# Patient Record
Sex: FEMALE | Race: WHITE | Employment: FULL TIME | URBAN - METROPOLITAN AREA
[De-identification: names, ages, dates, MRNs, and addresses within clinical notes are randomized per-mention and may not be internally consistent; named-entity substitution may affect disease eponyms.]

---

## 2017-12-04 ENCOUNTER — TRANSFERRED RECORDS (OUTPATIENT)
Dept: HEALTH INFORMATION MANAGEMENT | Facility: CLINIC | Age: 27
End: 2017-12-04

## 2018-10-24 ENCOUNTER — OFFICE VISIT (OUTPATIENT)
Dept: FAMILY MEDICINE | Facility: CLINIC | Age: 28
End: 2018-10-24
Payer: COMMERCIAL

## 2018-10-24 VITALS
SYSTOLIC BLOOD PRESSURE: 144 MMHG | DIASTOLIC BLOOD PRESSURE: 92 MMHG | TEMPERATURE: 99 F | HEART RATE: 92 BPM | WEIGHT: 268 LBS

## 2018-10-24 DIAGNOSIS — H57.89 EYE REDNESS: Primary | ICD-10-CM

## 2018-10-24 DIAGNOSIS — H57.11 EYE PAIN, RIGHT: ICD-10-CM

## 2018-10-24 LAB
ERYTHROCYTE [DISTWIDTH] IN BLOOD BY AUTOMATED COUNT: 12.8 % (ref 10–15)
ERYTHROCYTE [SEDIMENTATION RATE] IN BLOOD BY WESTERGREN METHOD: 12 MM/H (ref 0–20)
HCT VFR BLD AUTO: 41.8 % (ref 35–47)
HGB BLD-MCNC: 13.7 G/DL (ref 11.7–15.7)
MCH RBC QN AUTO: 28.7 PG (ref 26.5–33)
MCHC RBC AUTO-ENTMCNC: 32.8 G/DL (ref 31.5–36.5)
MCV RBC AUTO: 88 FL (ref 78–100)
PLATELET # BLD AUTO: 408 10E9/L (ref 150–450)
RBC # BLD AUTO: 4.77 10E12/L (ref 3.8–5.2)
WBC # BLD AUTO: 8.7 10E9/L (ref 4–11)

## 2018-10-24 PROCEDURE — 84443 ASSAY THYROID STIM HORMONE: CPT | Performed by: FAMILY MEDICINE

## 2018-10-24 PROCEDURE — 36415 COLL VENOUS BLD VENIPUNCTURE: CPT | Performed by: FAMILY MEDICINE

## 2018-10-24 PROCEDURE — 99203 OFFICE O/P NEW LOW 30 MIN: CPT | Performed by: FAMILY MEDICINE

## 2018-10-24 PROCEDURE — 85652 RBC SED RATE AUTOMATED: CPT | Performed by: FAMILY MEDICINE

## 2018-10-24 PROCEDURE — 80053 COMPREHEN METABOLIC PANEL: CPT | Performed by: FAMILY MEDICINE

## 2018-10-24 PROCEDURE — 86431 RHEUMATOID FACTOR QUANT: CPT | Performed by: FAMILY MEDICINE

## 2018-10-24 PROCEDURE — 85027 COMPLETE CBC AUTOMATED: CPT | Performed by: FAMILY MEDICINE

## 2018-10-24 NOTE — PROGRESS NOTES
SUBJECTIVE:   Estefania Mejía is a 28 year old female who presents to clinic today for the following health issues:        Eye(s) Problem  Onset: ~ 2 years    Description:   Location: left  Pain: YES  Redness: YES    Accompanying Signs & Symptoms:  Discharge/mattering: no   Swelling: no   Visual changes: no   Fever: no   Nasal Congestion: no   Bothered by bright lights: no     History:   Trauma: no   Foreign body exposure: no     Precipitating factors:   Wearing contacts: no     Therapies Tried and outcome: steroid drops, oral steroid drops     Patient has noticed her left eye is always slight erythematous and red on the conjunctiva.  She has this ongoing for 2 years on and off she has seen few providers which out any significant diagnosis.  She denies any blurry vision however sometimes it can cause some discomfort.  No other symptoms including upper respiratory.  No discharge.    Problem list and histories reviewed & adjusted, as indicated.  Additional history: as documented        Reviewed and updated as needed this visit by clinical staff  Tobacco  Allergies  Meds  Fam Hx  Soc Hx      Reviewed and updated as needed this visit by Provider         ROS:  CONSTITUTIONAL: NEGATIVE for fever, chills, change in weight  EYES: POSITIVE for eye pain left  ENT/MOUTH: NEGATIVE for ear, mouth and throat problems  RESP: NEGATIVE for significant cough or SOB  CV: NEGATIVE for chest pain, palpitations or peripheral edema    OBJECTIVE:                                                    BP (!) 144/92  Pulse 92  Temp 99  F (37.2  C) (Tympanic)  Wt 268 lb (121.6 kg)  LMP 10/15/2018  There is no height or weight on file to calculate BMI.   GENERAL: healthy, alert, well nourished, well hydrated, no distress  Eyes range of motion is normal.  Pupils are reactive to light.  Left side of the conjunctiva has some erythema.  No drainage.         ASSESSMENT/PLAN:                                                        ICD-10-CM     1. Eye redness H57.89 OPHTHALMOLOGY ADULT REFERRAL     ESR: Erythrocyte sedimentation rate     Comprehensive metabolic panel     CBC with platelets     TSH     Rheumatoid factor   2. Eye pain, right H57.11 OPHTHALMOLOGY ADULT REFERRAL     ESR: Erythrocyte sedimentation rate     Comprehensive metabolic panel     CBC with platelets     TSH     Rheumatoid factor     Exact etiology of patient I symptoms is not clear.  I suggested she should get an ophthalmology evaluation to make sure there is no other underlying cause for her symptoms.  I do not think this is allergy or conjunctivitis.  Meanwhile I will do some blood work including some inflammatory markers, thyroid ,CBC, CMP, and rheumatoid factor to make sure there is no other systemic symptom which is causing that.  If labs are normal it will be reassuring.    Obed Shaw MD  [facility]

## 2018-10-24 NOTE — MR AVS SNAPSHOT
After Visit Summary   10/24/2018    Estefania Mejía    MRN: [id number]           Patient Information     Date Of Birth          1990        Visit Information        Provider Department      10/24/2018 3:00 PM [facility]bed Shaw MD [facility]        Today's Diagnoses     Eye redness    -  1    Eye pain, right           Follow-ups after your visit        Additional Services     OPHTHALMOLOGY ADULT REFERRAL       Your provider has referred you to: N: [facility] [place] [phone] [email or web address]    Please be aware that coverage of these services is subject to the terms and limitations of your health insurance plan.  Call member services at your health plan with any benefit or coverage questions.      Please bring the following with you to your appointment:    (1) Any X-Rays, CTs or MRIs which have been performed.  Contact the facility where they were done to arrange for  prior to your scheduled appointment.    (2) List of current medications  (3) This referral request   (4) Any documents/labs given to you for this referral                  Who to contact     If you have questions or need follow up information about today's clinic visit or your schedule please contact [facility] [place] directly at [phone].  Normal or non-critical lab and imaging results will be communicated to you by MyChart, letter or phone within 4 business days after the clinic has received the results. If you do not hear from us within 7 days, please contact the clinic through MyChart or phone. If you have a critical or abnormal lab result, we will notify you by phone as soon as possible.  Submit refill requests through "[employer] or call your pharmacy and they will forward the refill request to us. Please allow 3 business days for your refill to be completed.          Additional Information About Your Visit        MyChart Information     "[employer] lets you  "send messages to your doctor, view your test results, renew your prescriptions, schedule appointments and more. To sign up, go to [email or web address] . Click on \"Log in\" on the left side of the screen, which will take you to the Welcome page. Then click on \"Sign up Now\" on the right side of the page.     You will be asked to enter the access code listed below, as well as some personal information. Please follow the directions to create your username and password.     Your access code is: [email or web address]  Expires: 2019  3:05 PM     Your access code will  in 90 days. If you need help or a new code, please call your [place] clinic or [phone].        Care EveryWhere ID     This is your Care EveryWhere ID. This could be used by other organizations to access your [place] medical records  [id number]        Your Vitals Were     Pulse Temperature Last Period             92 99  F (37.2  C) (Tympanic) 10/15/2018          Blood Pressure from Last 3 Encounters:   10/24/18 (!) 144/92    Weight from Last 3 Encounters:   10/24/18 268 lb (121.6 kg)              We Performed the Following     CBC with platelets     Comprehensive metabolic panel     ESR: Erythrocyte sedimentation rate     OPHTHALMOLOGY ADULT REFERRAL     Rheumatoid factor     TSH        Primary Care Provider    None Specified       No primary provider on file.        Equal Access to Services     [facility]: Hadii anila harikns hadasho Soomaali, waaxda luqadaha, qaybta kaalmada adeegyada, lisa taylor . So [facility] [phone].    ATENCIÓN: Si habla español, tiene a martínez disposición servicios gratuitos de asistencia lingüística. Llame al [phone].    We comply with applicable federal civil rights laws and [place] laws. We do not discriminate on the basis of race, color, national origin, age, disability, sex, sexual orientation, or gender identity.            Thank you!     Thank you for choosing [facility] [place]  for your care. Our goal is always to provide you with excellent care. Hearing back from our patients is one way we can continue to improve our services. Please take a few minutes to complete the written survey that you may receive in the mail after your visit with us. Thank you!             Your Updated Medication List - Protect others around you: Learn how to safely use, store and throw away your medicines at www.disposemymeds.org.      Notice  As of 10/24/2018  3:05 PM    You have not been prescribed any medications.

## 2018-10-25 ENCOUNTER — TELEPHONE (OUTPATIENT)
Dept: FAMILY MEDICINE | Facility: CLINIC | Age: 28
End: 2018-10-25

## 2018-10-25 LAB
ALBUMIN SERPL-MCNC: 3.9 G/DL (ref 3.4–5)
ALP SERPL-CCNC: 95 U/L (ref 40–150)
ALT SERPL W P-5'-P-CCNC: 42 U/L (ref 0–50)
ANION GAP SERPL CALCULATED.3IONS-SCNC: 10 MMOL/L (ref 3–14)
AST SERPL W P-5'-P-CCNC: 19 U/L (ref 0–45)
BILIRUB SERPL-MCNC: 0.3 MG/DL (ref 0.2–1.3)
BUN SERPL-MCNC: 10 MG/DL (ref 7–30)
CALCIUM SERPL-MCNC: 9.2 MG/DL (ref 8.5–10.1)
CHLORIDE SERPL-SCNC: 109 MMOL/L (ref 94–109)
CO2 SERPL-SCNC: 23 MMOL/L (ref 20–32)
CREAT SERPL-MCNC: 0.65 MG/DL (ref 0.52–1.04)
GFR SERPL CREATININE-BSD FRML MDRD: >90 ML/MIN/1.7M2
GLUCOSE SERPL-MCNC: 88 MG/DL (ref 70–99)
POTASSIUM SERPL-SCNC: 3.7 MMOL/L (ref 3.4–5.3)
PROT SERPL-MCNC: 7.5 G/DL (ref 6.8–8.8)
RHEUMATOID FACT SER NEPH-ACNC: <20 IU/ML (ref 0–20)
SODIUM SERPL-SCNC: 142 MMOL/L (ref 133–144)
TSH SERPL DL<=0.005 MIU/L-ACNC: 1.71 MU/L (ref 0.4–4)

## 2018-10-25 NOTE — TELEPHONE ENCOUNTER
TC has an DOMINIQUE for patient  Left voicemail message for the Office of Dr. Jaz Beckman-ENT to contact the clinic  With a fax number for medical records  DOMINIQUE in  [place]

## 2018-10-26 ENCOUNTER — TRANSFERRED RECORDS (OUTPATIENT)
Dept: HEALTH INFORMATION MANAGEMENT | Facility: CLINIC | Age: 28
End: 2018-10-26

## 2018-10-31 ENCOUNTER — HEALTH MAINTENANCE LETTER (OUTPATIENT)
Age: 28
End: 2018-10-31

## 2018-11-06 ENCOUNTER — RESULT FOLLOW UP (OUTPATIENT)
Dept: OBGYN | Facility: CLINIC | Age: 28
End: 2018-11-06

## 2018-11-06 ENCOUNTER — OFFICE VISIT (OUTPATIENT)
Dept: OBGYN | Facility: CLINIC | Age: 28
End: 2018-11-06
Payer: COMMERCIAL

## 2018-11-06 VITALS
WEIGHT: 269.2 LBS | DIASTOLIC BLOOD PRESSURE: 80 MMHG | BODY MASS INDEX: 43.26 KG/M2 | HEIGHT: 66 IN | HEART RATE: 86 BPM | SYSTOLIC BLOOD PRESSURE: 126 MMHG

## 2018-11-06 DIAGNOSIS — Z11.3 SCREEN FOR STD (SEXUALLY TRANSMITTED DISEASE): ICD-10-CM

## 2018-11-06 DIAGNOSIS — E66.813 CLASS 3 SEVERE OBESITY WITHOUT SERIOUS COMORBIDITY WITH BODY MASS INDEX (BMI) OF 40.0 TO 44.9 IN ADULT, UNSPECIFIED OBESITY TYPE (H): ICD-10-CM

## 2018-11-06 DIAGNOSIS — Z01.419 ENCOUNTER FOR GYNECOLOGICAL EXAMINATION WITHOUT ABNORMAL FINDING: Primary | ICD-10-CM

## 2018-11-06 DIAGNOSIS — E66.01 CLASS 3 SEVERE OBESITY WITHOUT SERIOUS COMORBIDITY WITH BODY MASS INDEX (BMI) OF 40.0 TO 44.9 IN ADULT, UNSPECIFIED OBESITY TYPE (H): ICD-10-CM

## 2018-11-06 DIAGNOSIS — Z11.8 SCREENING FOR CHLAMYDIAL DISEASE: ICD-10-CM

## 2018-11-06 DIAGNOSIS — E66.01 MORBID OBESITY (H): ICD-10-CM

## 2018-11-06 PROCEDURE — 88175 CYTOPATH C/V AUTO FLUID REDO: CPT | Performed by: OBSTETRICS & GYNECOLOGY

## 2018-11-06 PROCEDURE — 36415 COLL VENOUS BLD VENIPUNCTURE: CPT | Performed by: OBSTETRICS & GYNECOLOGY

## 2018-11-06 PROCEDURE — 86696 HERPES SIMPLEX TYPE 2 TEST: CPT | Performed by: OBSTETRICS & GYNECOLOGY

## 2018-11-06 PROCEDURE — 87340 HEPATITIS B SURFACE AG IA: CPT | Performed by: OBSTETRICS & GYNECOLOGY

## 2018-11-06 PROCEDURE — 87624 HPV HI-RISK TYP POOLED RSLT: CPT | Performed by: OBSTETRICS & GYNECOLOGY

## 2018-11-06 PROCEDURE — 88141 CYTOPATH C/V INTERPRET: CPT | Performed by: OBSTETRICS & GYNECOLOGY

## 2018-11-06 PROCEDURE — 99385 PREV VISIT NEW AGE 18-39: CPT | Performed by: OBSTETRICS & GYNECOLOGY

## 2018-11-06 PROCEDURE — 86695 HERPES SIMPLEX TYPE 1 TEST: CPT | Performed by: OBSTETRICS & GYNECOLOGY

## 2018-11-06 PROCEDURE — 87389 HIV-1 AG W/HIV-1&-2 AB AG IA: CPT | Performed by: OBSTETRICS & GYNECOLOGY

## 2018-11-06 PROCEDURE — 87491 CHLMYD TRACH DNA AMP PROBE: CPT | Performed by: OBSTETRICS & GYNECOLOGY

## 2018-11-06 PROCEDURE — 87591 N.GONORRHOEAE DNA AMP PROB: CPT | Performed by: OBSTETRICS & GYNECOLOGY

## 2018-11-06 PROCEDURE — 86780 TREPONEMA PALLIDUM: CPT | Performed by: OBSTETRICS & GYNECOLOGY

## 2018-11-06 RX ORDER — NEOMYCIN SULFATE, POLYMYXIN B SULFATE AND DEXAMETHASONE 3.5; 10000; 1 MG/ML; [USP'U]/ML; MG/ML
SUSPENSION/ DROPS OPHTHALMIC
COMMUNITY
Start: 2018-10-28 | End: 2019-02-04

## 2018-11-06 ASSESSMENT — ANXIETY QUESTIONNAIRES
1. FEELING NERVOUS, ANXIOUS, OR ON EDGE: SEVERAL DAYS
6. BECOMING EASILY ANNOYED OR IRRITABLE: SEVERAL DAYS
3. WORRYING TOO MUCH ABOUT DIFFERENT THINGS: SEVERAL DAYS
7. FEELING AFRAID AS IF SOMETHING AWFUL MIGHT HAPPEN: SEVERAL DAYS
5. BEING SO RESTLESS THAT IT IS HARD TO SIT STILL: SEVERAL DAYS
GAD7 TOTAL SCORE: 7
IF YOU CHECKED OFF ANY PROBLEMS ON THIS QUESTIONNAIRE, HOW DIFFICULT HAVE THESE PROBLEMS MADE IT FOR YOU TO DO YOUR WORK, TAKE CARE OF THINGS AT HOME, OR GET ALONG WITH OTHER PEOPLE: SOMEWHAT DIFFICULT
2. NOT BEING ABLE TO STOP OR CONTROL WORRYING: SEVERAL DAYS

## 2018-11-06 ASSESSMENT — PATIENT HEALTH QUESTIONNAIRE - PHQ9
5. POOR APPETITE OR OVEREATING: SEVERAL DAYS
SUM OF ALL RESPONSES TO PHQ QUESTIONS 1-9: 0

## 2018-11-06 NOTE — PROGRESS NOTES
Estefania is a 28 year old No obstetric history on file. female who presents for annual exam.     Besides routine health maintenance, she would like to discuss getting back on a birthcontrol method. Was previous on an ocp (possibly Reclipsen) and had side effects. Would like other option.    HPI:  New patient --self referral  --here today for yearly exam.  Recently moved to [place] from [place] with her fiance.  Doing well.  Had been on OCPs for a few years until May --stopped her reclipsen due to low libido and discomfort with sex.  Had been doing continuous OCPs.  Menses have been regular since stopping her pill but would like to restart.  +SA --currently using condoms.  Not interested in implants or IUDs.  Likely not interested in having children.  No bowel/bladder issues.  No leaking or urgency issues.     but getting remarried in Jan 2019! 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  Her current contraception method is: condoms.  She  reports that she has quit smoking. She has never used smokeless tobacco.    Patient is sexually active.  STD testing offered?  Accepted  Last PHQ-9 score on record =   PHQ-9 SCORE 11/6/2018   Total Score 0     Last GAD7 score on record =   NICOL-7 SCORE 11/6/2018   Total Score 7     Alcohol Score = 3    HEALTH MAINTENANCE:  Cholesterol: (No results found for: CHOL   Last Mammo: NA, due at age 40  Pap: per patient, pap in 2017 was abnormal-ASCUS, HPV POSITIVE (unsure of what  "kind of HPV)  Colonoscopy: NA, due at age 50  Dexa: Never    Health maintenance updated:  yes    HISTORY:  Obstetric History       T0      L0     SAB0   TAB0   Ectopic0   Multiple0   Live Births0           Patient Active Problem List   Diagnosis     Obesity     Morbid obesity (H)     Past Surgical History:   Procedure Laterality Date     NO HISTORY OF SURGERY        Social History   Substance Use Topics     Smoking status: Former Smoker     Smokeless tobacco: Never Used      Comment: quit      Alcohol use Yes      Problem (# of Occurrences) Relation (Name,Age of Onset)    Diabetes (2) Mother, Father: type 2     HEART DISEASE (1) Mother    Hypertension (1) Mother            Current Outpatient Prescriptions   Medication Sig     Homeopathic Products (SINUS MEDICINE PO)      neomycin-polymyxin-dexamethasone (MAXITROL) 3.5-43437-5.1 SUSP ophthalmic susp      No current facility-administered medications for this visit.      No Known Allergies    Past medical, surgical, social and family histories were reviewed and updated in EPIC.    ROS:   12 point review of systems negative other than symptoms noted below.  Head: Nasal Congestion  Breast: Lumps  Skin: Acne  Musculoskeletal: Joint Pain  Psychiatric: Anxiety    EXAM:  /80  Pulse 86  Ht 5' 6\" (1.676 m)  Wt 269 lb 3.2 oz (122.1 kg)  LMP 10/15/2018 (Exact Date)  BMI 43.45 kg/m2   BMI: Body mass index is 43.45 kg/(m^2).    PHYSICAL EXAM:  Constitutional:  Appearance: Well nourished, well developed, alert, in no acute distress  Neck:  Lymph Nodes:  No lymphadenopathy present    Thyroid:  Gland size normal, nontender, no nodules or masses present  on palpation  Chest:  Respiratory Effort:  Breathing unlabored  Cardiovascular:    Heart: Auscultation:  Regular rate, normal rhythm, no murmurs present  Breasts: Inspection of Breasts:  No lymphadenopathy present., Palpation of Breasts and Axillae:  No masses present on palpation, no breast tenderness., " Axillary Lymph Nodes:  No lymphadenopathy present. and No nodularity, asymmetry or nipple discharge bilaterally.  Gastrointestinal:   Abdominal Examination:  Abdomen nontender to palpation, tone normal without rigidity or guarding, no masses present, umbilicus without lesions   Liver and Spleen:  No hepatomegaly present, liver nontender to palpation    Hernias:  No hernias present  Lymphatic: Lymph Nodes:  No other lymphadenopathy present  Skin:  General Inspection:  No rashes present, no lesions present, no areas of  discoloration    Genitalia and Groin:  No rashes present, no lesions present, no areas of  discoloration, no masses present  Neurologic/Psychiatric:    Mental Status:  Oriented X3     Pelvic Exam:  External Genitalia:     Normal appearance for age, no discharge present, no tenderness present, no inflammatory lesions present, color normal  Vagina:     Normal vaginal vault without central or paravaginal defects, no discharge present, no inflammatory lesions present, no masses present  Bladder:     Nontender to palpation  Urethra:   Urethral Body:  Urethra palpation normal, urethra structural support normal   Urethral Meatus:  No erythema or lesions present  Cervix:     Appearance healthy, no lesions present, nontender to palpation, no bleeding present  Uterus:     Uterus: firm, normal sized and nontender, anteverted in position.   Adnexa:     No adnexal tenderness present, no adnexal masses present  Perineum:     Perineum within normal limits, no evidence of trauma, no rashes or skin lesions present  Anus:     Anus within normal limits, no hemorrhoids present  Inguinal Lymph Nodes:     No lymphadenopathy present  Pubic Hair:     Normal pubic hair distribution for age  Genitalia and Groin:     No rashes present, no lesions present, no areas of discoloration, no masses present      COUNSELING:   Reviewed preventive health counseling, as reflected in patient instructions  Special attention given to:         Regular exercise       Healthy diet/nutrition       Contraception       Family planning    BMI: Body mass index is 43.45 kg/(m^2).  Weight management plan: Discussed healthy diet and exercise guidelines and patient will follow up in 12 months in clinic to re-evaluate.    ASSESSMENT:  28 year old female with satisfactory annual exam.    ICD-10-CM    1. Encounter for gynecological examination without abnormal finding Z01.419 Pap imaged thin layer screen reflex to HPV if ASCUS - recommended age 25 - 29 years   2. Screen for STD (sexually transmitted disease) Z11.3 NEISSERIA GONORRHOEA PCR     Treponema Abs w Reflex to RPR and Titer     Herpes Simplex Virus 1 and 2 IgG     HIV Antigen Antibody Combo     Hepatitis B surface antigen   3. Screening for chlamydial disease Z11.8 CHLAMYDIA TRACHOMATIS PCR   4. Class 3 severe obesity without serious comorbidity with body mass index (BMI) of 40.0 to 44.9 in adult, unspecified obesity type (H) E66.01     Z68.41    5. Morbid obesity (H) E66.01        PLAN:  Patient Instructions   Follow up with your primary care provider for your other medical problems.  Continue self breast exam.  Increase physical activity and exercise.  Lab and pap smear results will be called to the patient.  Reflex pap smear done today and will need colposcopy if abnormal due to patient reported hx of irregular pap smear last year.  Usual safety and preventative measures counseling done.  BMI >25  Weight loss encouraged.  Will try lower dose OCP for contraception in hopes of improving side effect profile.  Will Sunday start with next menses and contact us needed.  Previously struggled with reclipsen.      Lashonda Anderson MD

## 2018-11-06 NOTE — MR AVS SNAPSHOT
After Visit Summary   11/6/2018    Estefania Mejía    MRN: [id number]           Patient Information     Date Of Birth          1990        Visit Information        Provider Department      11/6/2018 1:50 PM Lashonda Anderson MD [facility]        Today's Diagnoses     Encounter for gynecological examination without abnormal finding    -  1    Screen for STD (sexually transmitted disease)        Screening for chlamydial disease        Class 3 severe obesity without serious comorbidity with body mass index (BMI) of 40.0 to 44.9 in adult, unspecified obesity type (H)        Morbid obesity (H)          Care Instructions    Follow up with your primary care provider for your other medical problems.  Continue self breast exam.  Increase physical activity and exercise.  Lab and pap smear results will be called to the patient.  Reflex pap smear done today and will need colposcopy if abnormal due to patient reported hx of irregular pap smear last year.  Usual safety and preventative measures counseling done.  BMI >25  Weight loss encouraged.  Will try lower dose OCP for contraception in hopes of improving side effect profile.  Will Sunday start with next menses and contact us needed.  Previously struggled with reclipsen.          Follow-ups after your visit        Follow-up notes from your care team     Return in about 1 year (around 11/6/2019) for Annual Exam.      Who to contact     If you have questions or need follow up information about today's clinic visit or your schedule please contact [facility] directly at [phone].  Normal or non-critical lab and imaging results will be communicated to you by MyChart, letter or phone within 4 business days after the clinic has received the results. If you do not hear from us within 7 days, please contact the clinic through MyChart or phone. If you have a critical or abnormal lab result, we will notify you by phone as  "soon as possible.  Submit refill requests through [employer] or call your pharmacy and they will forward the refill request to us. Please allow 3 business days for your refill to be completed.          Additional Information About Your Visit        [place] Information     [employer] gives you secure access to your electronic health record. If you see a primary care provider, you can also send messages to your care team and make appointments. If you have questions, please call your primary care clinic.  If you do not have a primary care provider, please call [phone] and they will assist you.        Care EveryWhere ID     This is your Care EveryWhere ID. This could be used by other organizations to access your [place] medical records  [id number]        Your Vitals Were     Pulse Height Last Period BMI (Body Mass Index)          86 5' 6\" (1.676 m) 10/15/2018 (Exact Date) 43.45 kg/m2         Blood Pressure from Last 3 Encounters:   11/06/18 126/80   10/24/18 (!) 144/92    Weight from Last 3 Encounters:   11/06/18 269 lb 3.2 oz (122.1 kg)   10/24/18 268 lb (121.6 kg)              We Performed the Following     CHLAMYDIA TRACHOMATIS PCR     Hepatitis B surface antigen     Herpes Simplex Virus 1 and 2 IgG     HIV Antigen Antibody Combo     NEISSERIA GONORRHOEA PCR     Pap imaged thin layer screen reflex to HPV if ASCUS - recommended age 25 - 29 years     Treponema Abs w Reflex to RPR and Titer        Primary Care Provider Office Phone # Fax #    Obed Shaw [phone]       [address]  [place] [address]        Equal Access to Services     [facility]: Hadii aad ku hadasho Sokrunal, waaxda luqadaha, qaybta kaalmada nenayatimothy, lisa ruiz. So [facility] [phone].    ATENCIÓN: Si habla español, tiene a martínez disposición servicios gratuitos de asistencia lingüística. Llame al [phone].    We comply with applicable federal civil rights laws and [place] laws. We " do not discriminate on the basis of race, color, national origin, age, disability, sex, sexual orientation, or gender identity.            Thank you!     Thank you for choosing [facility] [place]  for your care. Our goal is always to provide you with excellent care. Hearing back from our patients is one way we can continue to improve our services. Please take a few minutes to complete the written survey that you may receive in the mail after your visit with us. Thank you!             Your Updated Medication List - Protect others around you: Learn how to safely use, store and throw away your medicines at www.disposemymeds.org.          This list is accurate as of 11/6/18  2:24 PM.  Always use your most recent med list.                   Brand Name Dispense Instructions for use Diagnosis    neomycin-polymyxin-dexamethasone 3.5-84797-4.1 Susp ophthalmic susp    MAXITROL          SINUS MEDICINE PO

## 2018-11-06 NOTE — PATIENT INSTRUCTIONS
Follow up with your primary care provider for your other medical problems.  Continue self breast exam.  Increase physical activity and exercise.  Lab and pap smear results will be called to the patient.  Reflex pap smear done today and will need colposcopy if abnormal due to patient reported hx of irregular pap smear last year.  Usual safety and preventative measures counseling done.  BMI >25  Weight loss encouraged.  Will try lower dose OCP for contraception in hopes of improving side effect profile.  Will Sunday start with next menses and contact us needed.  Previously struggled with reclipsen.

## 2018-11-07 ENCOUNTER — TELEPHONE (OUTPATIENT)
Dept: OBGYN | Facility: CLINIC | Age: 28
End: 2018-11-07

## 2018-11-07 DIAGNOSIS — Z30.011 OCP (ORAL CONTRACEPTIVE PILLS) INITIATION: Primary | ICD-10-CM

## 2018-11-07 LAB
C TRACH DNA SPEC QL NAA+PROBE: NEGATIVE
HBV SURFACE AG SERPL QL IA: NONREACTIVE
HIV 1+2 AB+HIV1 P24 AG SERPL QL IA: NONREACTIVE
HSV1 IGG SERPL QL IA: 1.2 AI (ref 0–0.8)
HSV2 IGG SERPL QL IA: <0.2 AI (ref 0–0.8)
N GONORRHOEA DNA SPEC QL NAA+PROBE: NEGATIVE
SPECIMEN SOURCE: NORMAL
SPECIMEN SOURCE: NORMAL
T PALLIDUM AB SER QL: NONREACTIVE

## 2018-11-07 RX ORDER — LEVONORGESTREL/ETHIN.ESTRADIOL 0.1-0.02MG
1 TABLET ORAL DAILY
Qty: 84 TABLET | Refills: 3 | Status: SHIPPED | OUTPATIENT
Start: 2018-11-07 | End: 2019-07-24

## 2018-11-07 ASSESSMENT — ANXIETY QUESTIONNAIRES: GAD7 TOTAL SCORE: 7

## 2018-11-07 NOTE — TELEPHONE ENCOUNTER
Seen Dr. Anderson 11/6/18 -  Birth control was not sent to pharmacy     [facility] - [facility] [place]

## 2018-11-07 NOTE — PROGRESS NOTES
Please inform of normal results --STD testing all negative with exception of HSV-1 which is due to cold sores or past exposure to oral herpes.  Pap smear results will come back next week.

## 2018-11-07 NOTE — TELEPHONE ENCOUNTER
Annual 11/7/18. Pt requesting Rx for OCP. Routing to Dr. Anderson.         Will try lower dose OCP for contraception in hopes of improving side effect profile.  Will Sunday start with next menses and contact us needed.  Previously struggled with reclipsen.

## 2018-11-09 LAB
COPATH REPORT: ABNORMAL
PAP: ABNORMAL

## 2018-11-12 LAB
FINAL DIAGNOSIS: ABNORMAL
HPV HR 12 DNA CVX QL NAA+PROBE: POSITIVE
HPV16 DNA SPEC QL NAA+PROBE: NEGATIVE
HPV18 DNA SPEC QL NAA+PROBE: NEGATIVE
SPECIMEN DESCRIPTION: ABNORMAL
SPECIMEN SOURCE CVX/VAG CYTO: ABNORMAL

## 2018-11-13 ENCOUNTER — TRANSFERRED RECORDS (OUTPATIENT)
Dept: HEALTH INFORMATION MANAGEMENT | Facility: CLINIC | Age: 28
End: 2018-11-13

## 2018-11-13 NOTE — PROGRESS NOTES
12/4/17 ASCUS/+ HPV- no follow up done (transferred record/pt report)  11/6/18 ASCUS pap, + HR HPV (not 16 or 18). Plan: colpo  11/14/18 LM for pt to return call to [phone] // Pt notified (ajk)  12/5/18 [place]: Bx- AXEL 1.  Plan: cotest in 1 year (lks)  12/24/19 ASCUS pap, +HR HPV (not 16/18). 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 Plan: cotest in 1 year (lce)

## 2018-12-05 ENCOUNTER — OFFICE VISIT (OUTPATIENT)
Dept: OBGYN | Facility: CLINIC | Age: 28
End: 2018-12-05
Payer: COMMERCIAL

## 2018-12-05 VITALS — BODY MASS INDEX: 42.93 KG/M2 | SYSTOLIC BLOOD PRESSURE: 134 MMHG | DIASTOLIC BLOOD PRESSURE: 82 MMHG | WEIGHT: 266 LBS

## 2018-12-05 DIAGNOSIS — R87.610 ASCUS WITH POSITIVE HIGH RISK HPV CERVICAL: Primary | ICD-10-CM

## 2018-12-05 DIAGNOSIS — R87.810 ASCUS WITH POSITIVE HIGH RISK HPV CERVICAL: Primary | ICD-10-CM

## 2018-12-05 PROCEDURE — 57455 BIOPSY OF CERVIX W/SCOPE: CPT | Performed by: OBSTETRICS & GYNECOLOGY

## 2018-12-05 PROCEDURE — 88305 TISSUE EXAM BY PATHOLOGIST: CPT | Performed by: OBSTETRICS & GYNECOLOGY

## 2018-12-05 NOTE — PROGRESS NOTES
INDICATIONS:                                                    Is a pregnancy test required: No.  Was a consent obtained?  Yes    Estefania Mejía, is a 28 year old female, who had a recent ASCUS pap.  HPV other (+)pos.  Yes prior history of abnormal pap --had first abnormal pap smear last year in [place] (ASC-US, +other HPV) but did not follow up due to insurance issues. Here today for first colposcopy. Discussed indication, risks of infection and bleeding.    Her last pap was   Lab Results   Component Value Date    PAP ASC-US 11/06/2018    .    PROCEDURE:                                                      Cervix is stained with acetic acid and viewed colposcopically. Squamocolumnar junction is visualized in it's entirety. acetowhite lesion(s) noted at 6-7 and 10-12 o'clock . Biopsy done yes x 2. Endocervical curretage Not Done             POST PROCEDURE:                                                      IMPRESSION: Patient tolerated procedure well, colposcopy adequate and HPV    PLAN : Await the results of the biopsies.  Repeat pap in 12 months.  She  tolerated the procedure well. There were no complications. Patient was discharged in stable condition.    Patient advised to call the clinic if excessive bleeding, pelvic pain, or fever.     Follow-up plan based on pathology results.    Patient Instructions   Pap smear results were discussed with the patient and their significance. Discussed HPV and transmission. Pointed out HPV's role in dysplasia and timeline for possible resolution of disease. Discussed follow up protocols and reasons to intervene with LEEP procedure vs expectant management. Discussed healthy lifestyles and their effects on the body's ability to fight off the virus.   I will call Estefania with results and follow up plan later this week.      Lashonda Anderson MD

## 2018-12-05 NOTE — PATIENT INSTRUCTIONS
Pap smear results were discussed with the patient and their significance. Discussed HPV and transmission. Pointed out HPV's role in dysplasia and timeline for possible resolution of disease. Discussed follow up protocols and reasons to intervene with LEEP procedure vs expectant management. Discussed healthy lifestyles and their effects on the body's ability to fight off the virus.   I will call Estefania with results and follow up plan later this week.

## 2018-12-05 NOTE — MR AVS SNAPSHOT
After Visit Summary   12/5/2018    Estefania Mejía    MRN: [id number]           Patient Information     Date Of Birth          1990        Visit Information        Provider Department      12/5/2018 11:15 AM Lashonda Anderson MD; [facility] Lisa [facility]urn        Today's Diagnoses     ASCUS with positive high risk HPV cervical    -  1      Care Instructions    Pap smear results were discussed with the patient and their significance. Discussed HPV and transmission. Pointed out HPV's role in dysplasia and timeline for possible resolution of disease. Discussed follow up protocols and reasons to intervene with LEEP procedure vs expectant management. Discussed healthy lifestyles and their effects on the body's ability to fight off the virus.   I will call Estefania with results and follow up plan later this week.          Follow-ups after your visit        Follow-up notes from your care team     Return if symptoms worsen or fail to improve.      Who to contact     If you have questions or need follow up information about today's clinic visit or your schedule please contact [facility] directly at [phone].  Normal or non-critical lab and imaging results will be communicated to you by [employer], letter or phone within 4 business days after the clinic has received the results. If you do not hear from us within 7 days, please contact the clinic through [employer] or phone. If you have a critical or abnormal lab result, we will notify you by phone as soon as possible.  Submit refill requests through [employer] or call your pharmacy and they will forward the refill request to us. Please allow 3 business days for your refill to be completed.          Additional Information About Your Visit        MyChart Information     [employer] gives you secure access to your electronic health record. If you see a primary care provider, you can also send messages to your care team and make  appointments. If you have questions, please call your primary care clinic.  If you do not have a primary care provider, please call [phone] and they will assist you.        Care EveryWhere ID     This is your Care EveryWhere ID. This could be used by other organizations to access your [place] medical records  [id number]        Your Vitals Were     Last Period Breastfeeding? BMI (Body Mass Index)             10/15/2018 (Exact Date) No 42.93 kg/m2          Blood Pressure from Last 3 Encounters:   12/05/18 134/82   11/06/18 126/80   10/24/18 (!) 144/92    Weight from Last 3 Encounters:   12/05/18 266 lb (120.7 kg)   11/06/18 269 lb 3.2 oz (122.1 kg)   10/24/18 268 lb (121.6 kg)              We Performed the Following     COLPOSCOPY CERVIX/UPPER VAGINA W BX CERVIX     Surgical pathology exam        Primary Care Provider Office Phone # Fax #    Obed MD Valeria [phone]       [address] [place] [address]        Equal Access to Services     [facility]: Hadii aad ku hadasho Soomaali, waaxda luqadaha, qaybta kaalmada adeluisa, lisa taylor . So [facility] [phone].    ATENCIÓN: Si habla español, tiene a martínez disposición servicios gratuitos de asistencia lingüística. Karen al [phone].    We comply with applicable federal civil rights laws and [place] laws. We do not discriminate on the basis of race, color, national origin, age, disability, sex, sexual orientation, or gender identity.            Thank you!     Thank you for choosing [facility] FOR [facility]  for your care. Our goal is always to provide you with excellent care. Hearing back from our patients is one way we can continue to improve our services. Please take a few minutes to complete the written survey that you may receive in the mail after your visit with us. Thank you!             Your Updated Medication List - Protect others around you: Learn how to safely use, store and throw  away your medicines at www.disposemymeds.org.          This list is accurate as of 12/5/18 11:45 AM.  Always use your most recent med list.                   Brand Name Dispense Instructions for use Diagnosis    levonorgestrel-ethinyl estradiol 0.1-20 MG-MCG tablet    AVIANE/ALESSE/LESSINA    84 tablet    Take 1 tablet by mouth daily    OCP (oral contraceptive pills) initiation       neomycin-polymyxin-dexamethasone 3.5-54167-0.1 Susp ophthalmic susp    MAXITROL          SINUS MEDICINE PO

## 2018-12-07 LAB — COPATH REPORT: NORMAL

## 2018-12-14 ENCOUNTER — OFFICE VISIT (OUTPATIENT)
Dept: FAMILY MEDICINE | Facility: CLINIC | Age: 28
End: 2018-12-14
Payer: COMMERCIAL

## 2018-12-14 VITALS
DIASTOLIC BLOOD PRESSURE: 82 MMHG | WEIGHT: 266 LBS | HEART RATE: 112 BPM | SYSTOLIC BLOOD PRESSURE: 130 MMHG | BODY MASS INDEX: 42.93 KG/M2 | TEMPERATURE: 98 F

## 2018-12-14 DIAGNOSIS — G47.26 SHIFT WORK SLEEP DISORDER: Primary | ICD-10-CM

## 2018-12-14 PROCEDURE — 99213 OFFICE O/P EST LOW 20 MIN: CPT | Performed by: NURSE PRACTITIONER

## 2018-12-14 RX ORDER — ZOLPIDEM TARTRATE 10 MG/1
5 TABLET ORAL
Qty: 25 TABLET | Refills: 0 | Status: SHIPPED | OUTPATIENT
Start: 2018-12-14 | End: 2019-02-04

## 2018-12-14 NOTE — PROGRESS NOTES
"  SUBJECTIVE:                                                      Estefania Mejía is a 28 year old female who presents to clinic today for the following health issues:    Insomnia  Onset: 2 months - works overnights     Description:   Time to fall asleep (sleep latency): depends   Middle of night awakening:  YES  Early morning awakening:  YES    Progression of Symptoms:  worsening    Accompanying Signs & Symptoms:  Daytime sleepiness/napping: YES  Excessive snoring/apnea: no  Restless legs: YES-  Frequent urination: no  Chronic pain:  no    History:  Prior Insomnia: YES    Precipitating factors:   New stressful situation: YES- is in school   Caffeine intake: yes   OTC decongestants: no  Any new medications: BC a month ago     Alleviating factors:  Self medicating (alcohol, etc.):  no    Therapies Tried and outcome: benadryl, melatonin, essential oils, flexeril     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 Only getting 3-4 hours of sleep each night. Has really affected her mood and lifestyle. Tried melatonin, Benadryl, trazodone, flexeril. Has blackout curtains in her room. Comes home, eats breakfast, and goes to bed. States that she is actually tired and does fall asleep relatively quickly. Her issue is staying asleep for more than a couple of hours. Doesn't use caffeine heavily (has reduced this recently, actually). Flexeril helps, but she feels \"hungover\" and \"groggy\" after awakening. In the past, she has struggled with anxiety-related insomnia for which she was prescribed suvorexant. She did not like this medication. Denies PTSD, sleep apnea, restless legs syndrome, endocrine dysfunction,nocturnal urinary frequency, stimulant use, or other organic cause for her symptoms. Feels like her body would eventually adjust and acclimate to her new schedule, but she may need something short-term to help her dampen the negative effects of sleep deprivation on her daily " life.    Problem list and histories reviewed & adjusted, as indicated.  Additional history: as documented    Reviewed and updated as needed this visit by clinical staff  Tobacco  Allergies  Meds  Problems  Med Hx  Surg Hx  Fam Hx       Reviewed and updated as needed this visit by Provider  Tobacco  Allergies  Meds  Problems  Med Hx  Surg Hx  Fam Hx         ROS:  Constitutional, HEENT, pulmonary, , musculoskeletal, neuro, endocrine and psych systems are negative, except as otherwise noted.    OBJECTIVE:                                                      /82 (BP Location: Right arm, Patient Position: Chair, Cuff Size: Adult Large)   Pulse 112   Temp 98  F (36.7  C) (Tympanic)   Wt 120.7 kg (266 lb)   LMP 10/15/2018   BMI 42.93 kg/m   Body mass index is 42.93 kg/m .   GENERAL: healthy, alert, well nourished, well hydrated, no distress  NEURO: mentation- intact, speech- normal  PSYCH: Alert and oriented times 3; speech- coherent , normal rate and volume; able to articulate logical thoughts, able to abstract reason, no tangential thoughts, no hallucinations or delusions, affect- normal    Diagnostic test results:  none     ASSESSMENT/PLAN:                                                      Estefania was seen today for insomnia. Ideally, this issue is transient in nature, and she will eventually become accustomed to this unorthodox sleep schedule. In the meantime, I feel like, in the interest of her health and wellbeing, a short-term pharmacologic regimen of a sleep aid is warranted and necessary. After discussing all of the options (including agents that she has tried and failed in the past), we determined that zolpidem would be a good choice for her. She agrees to try this before an episode of sleep on a non-working day. If this works for her, she agrees to use it only as directed and as needed with the eventual goal of no longer needing it. Also discussed other potential facilitators of  sleep, including abstinence from caffeine and staying awake longer after getting home from work to ensure that she is tired enough to fall and stay asleep until her desired wakening time. Discussed risks, benefits, alternatives, and potential side effects of new medication / treatment. Agrees with plan of care. All questions answered. Will see in two months for follow up (unless she has achieved good quantity and quality of sleep by then without the assistance of Ambien).      Diagnoses and all orders for this visit:    Shift work sleep disorder  -     zolpidem (AMBIEN) 10 MG tablet; Take 0.5 tablets (5 mg) by mouth nightly as needed for sleep      Risks, benefits and alternatives of treatments discussed. Plan agreed upon and all questions answered.      Follow-Up: Return in about 2 months (around 2/14/2019).    See Patient Instructions      TONY Lugo, CNP

## 2019-02-04 ENCOUNTER — APPOINTMENT (OUTPATIENT)
Dept: GENERAL RADIOLOGY | Facility: CLINIC | Age: 29
End: 2019-02-04
Payer: COMMERCIAL

## 2019-02-04 ENCOUNTER — TRANSFERRED RECORDS (OUTPATIENT)
Dept: HEALTH INFORMATION MANAGEMENT | Facility: CLINIC | Age: 29
End: 2019-02-04

## 2019-02-04 ENCOUNTER — HOSPITAL ENCOUNTER (EMERGENCY)
Facility: CLINIC | Age: 29
Discharge: HOME | End: 2019-02-04
Attending: EMERGENCY MEDICINE | Admitting: EMERGENCY MEDICINE
Payer: COMMERCIAL

## 2019-02-04 ENCOUNTER — TELEPHONE (OUTPATIENT)
Dept: FAMILY MEDICINE | Facility: CLINIC | Age: 29
End: 2019-02-04

## 2019-02-04 VITALS
RESPIRATION RATE: 16 BRPM | OXYGEN SATURATION: 100 % | TEMPERATURE: 97.1 F | BODY MASS INDEX: 42.93 KG/M2 | HEART RATE: 82 BPM | HEIGHT: 66 IN | SYSTOLIC BLOOD PRESSURE: 142 MMHG | DIASTOLIC BLOOD PRESSURE: 80 MMHG

## 2019-02-04 DIAGNOSIS — S52.509S CLOSED FRACTURE OF DISTAL END OF RADIUS, UNSPECIFIED FRACTURE MORPHOLOGY, UNSPECIFIED LATERALITY, SEQUELA: Primary | ICD-10-CM

## 2019-02-04 DIAGNOSIS — S52.611A CLOSED DISPLACED FRACTURE OF STYLOID PROCESS OF RIGHT ULNA, INITIAL ENCOUNTER: ICD-10-CM

## 2019-02-04 DIAGNOSIS — S52.571A OTHER CLOSED INTRA-ARTICULAR FRACTURE OF DISTAL END OF RIGHT RADIUS, INITIAL ENCOUNTER: ICD-10-CM

## 2019-02-04 PROCEDURE — 25000128 H RX IP 250 OP 636: Performed by: EMERGENCY MEDICINE

## 2019-02-04 PROCEDURE — 29515 APPLICATION SHORT LEG SPLINT: CPT | Mod: RT

## 2019-02-04 PROCEDURE — 96376 TX/PRO/DX INJ SAME DRUG ADON: CPT

## 2019-02-04 PROCEDURE — 73110 X-RAY EXAM OF WRIST: CPT | Mod: RT

## 2019-02-04 PROCEDURE — 96374 THER/PROPH/DIAG INJ IV PUSH: CPT

## 2019-02-04 PROCEDURE — 99285 EMERGENCY DEPT VISIT HI MDM: CPT | Mod: 25

## 2019-02-04 PROCEDURE — 96375 TX/PRO/DX INJ NEW DRUG ADDON: CPT

## 2019-02-04 RX ORDER — ONDANSETRON 2 MG/ML
4 INJECTION INTRAMUSCULAR; INTRAVENOUS ONCE
Status: COMPLETED | OUTPATIENT
Start: 2019-02-04 | End: 2019-02-04

## 2019-02-04 RX ORDER — OXYCODONE HYDROCHLORIDE 5 MG/1
5 TABLET ORAL EVERY 6 HOURS PRN
Qty: 8 TABLET | Refills: 0 | Status: SHIPPED | OUTPATIENT
Start: 2019-02-04 | End: 2019-05-07

## 2019-02-04 RX ORDER — HYDROMORPHONE HYDROCHLORIDE 1 MG/ML
0.5 INJECTION, SOLUTION INTRAMUSCULAR; INTRAVENOUS; SUBCUTANEOUS
Status: DISCONTINUED | OUTPATIENT
Start: 2019-02-04 | End: 2019-02-04 | Stop reason: HOSPADM

## 2019-02-04 RX ORDER — ONDANSETRON 4 MG/1
4-8 TABLET, ORALLY DISINTEGRATING ORAL EVERY 8 HOURS PRN
Qty: 30 TABLET | Refills: 0 | Status: SHIPPED | OUTPATIENT
Start: 2019-02-04 | End: 2019-05-07

## 2019-02-04 RX ADMIN — HYDROMORPHONE HYDROCHLORIDE 0.5 MG: 1 INJECTION, SOLUTION INTRAMUSCULAR; INTRAVENOUS; SUBCUTANEOUS at 10:09

## 2019-02-04 RX ADMIN — ONDANSETRON 4 MG: 2 INJECTION INTRAMUSCULAR; INTRAVENOUS at 11:02

## 2019-02-04 RX ADMIN — HYDROMORPHONE HYDROCHLORIDE 0.5 MG: 1 INJECTION, SOLUTION INTRAMUSCULAR; INTRAVENOUS; SUBCUTANEOUS at 09:16

## 2019-02-04 RX ADMIN — ONDANSETRON 4 MG: 2 INJECTION INTRAMUSCULAR; INTRAVENOUS at 09:13

## 2019-02-04 RX ADMIN — ONDANSETRON 4 MG: 2 INJECTION INTRAMUSCULAR; INTRAVENOUS at 12:03

## 2019-02-04 ASSESSMENT — ENCOUNTER SYMPTOMS
NUMBNESS: 0
FEVER: 0

## 2019-02-04 NOTE — ED AVS SNAPSHOT
Emergency Department  [address] [place] [address]  Phone:  [phone]  Fax:  [phone]                                    Estefania Mejía   MRN: [id number]    Department:   Emergency Department   Date of Visit:  2/4/2019           After Visit Summary Signature Page    I have received my discharge instructions, and my questions have been answered. I have discussed any challenges I see with this plan with the nurse or doctor.    ..........................................................................................................................................  Patient/Patient Representative Signature      ..........................................................................................................................................  Patient Representative Print Name and Relationship to Patient    ..................................................               ................................................  Date                                   Time    ..........................................................................................................................................  Reviewed by Signature/Title    ...................................................              ..............................................  Date                                               Time          22EPIC Rev 08/18

## 2019-02-04 NOTE — LETTER
February 4, 2019      To Whom It May Concern:      Estefania Mejía was seen in our Emergency Department today, 02/04/19.  Please excuse her from work until she is cleared by orthopedics to return.    Sincerely,        Maribel Rowan MD

## 2019-02-04 NOTE — DISCHARGE INSTRUCTIONS
*Elevate your arm as much as possible.  *Ibuprofen and/or Tylenol as directed as needed for pain.  Oxycodone for severe pain not relieved by ibuprofen and/or Tylenol.  *Follow-up in clinic with Dr. Dodson this afternoon.  You should receive a call from his care coordinator.  *Return if you develop worsening pain, number cool fingers, color change in her fingers, faint or feel like you will faint or become worse in any way.    Discharge Instructions  Splint Care    You had a splint put on today to help protect your injury and help it heal.  Splints are used to treat things like strains, sprains, large cuts, and fractures (broken bones). Splints are temporary and are designed to allow for swelling.    Be sure your splint is not too tight!  If you splint is too tight, it may cause loss of blood supply.  Signs of your splint being too tight include: your arm or leg hurting a lot more; your fingers or toes getting numb, cold, pale or blue; or your child is crying, fussing or seeming restless.    Generally, every Emergency Department visit should have a follow-up clinic visit with either a primary or a specialty clinic/provider. Please follow-up as instructed by your emergency provider today.  Return to the Emergency Department right away if:  You have increased pain or pressure around the injury.  You have numbness, tingling, or cool, pale, or blue toes or fingers past the injury.  Your child is more fussy than normal, crying a lot, or restless.  Your splint becomes soft, breaks, or is wet.  Your splint begins to smell bad.  Your splint is cutting into your skin.    Home care:  Keep the injured area above the level of your heart while laying or sitting down.  This will help decrease the swelling and the pain.  Keep the splint dry.  Do not put objects down or inside the splint.  If there is an elastic bandage (Ace  wrap) holding the splint on this may be loosened slightly to relieve pressure or pain.  If pain continues  return to the Emergency Department right away.  Do not remove your splint by yourself unless told to by your provider.    Follow-up:  Sometimes the splint put on in the Emergency Department needs to be changed once the swelling has gone down and a more permanent cast needs to be placed.  This is usually done by a bone specialist provider (Orthopedist).  Follow the instructions given to you by your provider today.    If you were given a prescription for medicine here today, be sure to read all of the information (including the package insert) that comes with your prescription.  This will include important information about the medicine, its side effects, and any warnings that you need to know about.  The pharmacist who fills the prescription can provide more information and answer questions you may have about the medicine.  If you have questions or concerns that the pharmacist cannot address, please call or return to the Emergency Department.     Remember that you can always come back to the Emergency Department if you are not able to see your regular provider in the amount of time listed above, if you get any new symptoms, or if there is anything that worries you.

## 2019-02-04 NOTE — ED PROVIDER NOTES
"  History     Chief Complaint:  Fall    HPI   Estefania Mejía is a 28 year old female who presents after a fall. The patient slipped and fell on the ice, landing on her buttocks and she braced herself with her right hand. She injured her right wrist. She denies hitting her head or any loss of consciousness. She denies any numbness to her right hand. The patient is right handed. She denies any other injuries. The patient is a nurse here at [employer].     Allergies:  No known drug allergies.    Medications:    Levonorgestrel-ethinyl estradiol (aviane,alesse,lessina) 0.1-20 mg-mcg per tablet  Zolpidem (ambien) 10 mg tablet     Past Medical History:    Anxiety  ASCUS with positive high risk HPV cervical  Morbid obesity    Past Surgical History:    History reviewed. No pertinent past surgical history.    Family History:    Hypertension  Diabetes  Heart disease    Social History:  Patient is   Tobacco Use: Former smoker  Alcohol Use: Yes  PCP: Obed Shaw     Review of Systems   Constitutional: Negative for fever.   Musculoskeletal:        Right wrist injury   Neurological: Negative for numbness.        No LOC   All other systems reviewed and are negative.    Physical Exam   First Vitals:  Patient Vitals for the past 24 hrs:   BP Temp Temp src Pulse Resp SpO2 Height   02/04/19 0749 138/72 97.1  F (36.2  C) Oral 78 18 97 % 1.676 m (5' 6\")     Physical Exam  General: Well-nourished, appears to be in severe pain   Eyes: PERRL, conjunctivae pink no scleral icterus or conjunctival injection  ENT:  Moist mucus membranes, posterior oropharynx clear without erythema or exudates, no hemotympanum  Respiratory:  Lungs clear to auscultation bilaterally, no crackles/rubs/wheezes.  Good air movement.  No seatbelt sign or ecchymoses  CV: Normal rate and rhythm, no murmurs/rubs/gallops  GI:  Abdomen soft and non-distended.  Normoactive BS.  No tenderness, guarding or rebound  Skin: Warm, dry.  No rashes or " petechiae  Musculoskeletal: +obvious deformity with ecchmosis and tenderness at right wrist.  Normal symmetric radial pulse and distal capillary refill, sensation and temperature.  No peripheral edema or calf tenderness.  No midline tenderness of the cervical/thoracic/lumbar spine.  No tenderness/crepitus/bony stepoffs over the clavicles, chest wall, pelvis, remainder of arms or legs.  Neuro: Alert and oriented to person/place/time  Psychiatric: Normal affect      Emergency Department Course     Imaging:  Radiographic findings were communicated with the patient who voiced understanding of the findings.  XR wrist right 3 views:  There is a comminuted intra-articular fracture of the  distal right radius with apex volar angulation and shortening. There  is also a displaced comminuted fracture of the ulnar styloid. Per radiology read.    Interventions:  0913: Zofran 4mg IV  0916: Dilaudid 0.5mg IV  1102: Zofran 4mg IV  1203: Zofran 4mg IV      Narrative:      Custom plaster reverse sugar tong splint was applied to the right wrist. After placement I checked and adjusted the fit to ensure proper positioning. Patient was more comfortable with splint in place. Sensation and circulation are intact after splint placement.      Emergency Department Course:  9:58 AM Nursing notes and vitals reviewed.  I performed an exam of the patient as documented above.   9:58 AM I consulted with Dr. Putnam of orthopedics regarding the patient who recommended outpatient follow up.  10:30 AM I spoke with Dr. Dodson for hand surgery.  I updated the patient with recommendation to sedate and reduce prior to splinting.  She declined.  She understands the risks and the benefits of sedation and plans to follow-up in clinic this afternoon with surgery tomorrow.  12:08 PM Findings and plan explained to the patient. Patient discharged home with instructions regarding supportive care, medications, and reasons to return. The importance of close  follow-up was reviewed.     Impression & Plan      Medical Decision Making:  Estefania Mejía is a 28 year old female who presents for evaluation of wrist pain after a slip and fall on ice.  CMS is intact distally in the extremity.  Xrays reveal a fracture that is reduced successfully in ED with subsequent splint placement, see above procedure notes.  I did consult with both general trauma orthopedics doctor for likely as well as the hand surgeon Dr. Dodson.  Initially Orth O recommended splint placement with short follow-up but no reduction.  Dr. Dodson thought the patient might feel more comfortable with reduction.  I discussed this with Ms. Parker him and she declined reduction as it would require sedation or a hematoma block.  Dr. Dodson was actually able to arrange for follow-up later this afternoon and his outpatient clinic with the plan for surgery tomorrow.  The patients head to toe trauma exam is otherwise normal at this time and no further trauma workup is needed as I believe there is no signs of serious head, neck, chest, spinal, extremity or abdominal injuries warranting this.      Diagnosis:    ICD-10-CM    1. Other closed intra-articular fracture of distal end of right radius, initial encounter S52.571A    2. Closed displaced fracture of styloid process of right ulna, initial encounter S52.611A        Disposition:  discharged to home    Discharge Medications:     Medication List      Started    ondansetron 4 MG ODT tab  Commonly known as:  ZOFRAN ODT  4-8 mg, Oral, EVERY 8 HOURS PRN     oxyCODONE 5 MG tablet  Commonly known as:  ROXICODONE  5 mg, Oral, EVERY 6 HOURS PRN          I, Bradley Aasen, am serving as a scribe on 2/4/2019 at 8:55 AM to personally document services performed by Maribel Rowan MD based on my observations and the provider's statements to me.          Maribel Rowan MD  02/06/19 0686

## 2019-02-04 NOTE — TELEPHONE ENCOUNTER
Reason for Call:  Other Referral    Detailed comments: Pt fell and broke her wrist, she has surgery scheduled for 2/5/19 Please send a referral over to [facility] Fax [phone]    Phone Number Patient can be reached at: Home number on file [phone] (home)    Best Time:     Can we leave a detailed message on this number? YES    Call taken on 2/4/2019 at 3:27 PM by Tanvi Tomlin

## 2019-02-04 NOTE — TELEPHONE ENCOUNTER
Patient called after contacting her insurance. States they suggested to put in two referrals; one to the [facility] and another to Dr. Anderson Dodson.     Ph: [phone] detailed message christophe DA SILVA  Patient Representative - [place]

## 2019-02-04 NOTE — TELEPHONE ENCOUNTER
Patient fell this morning went to [facility] and was scheduled for surgery tomorrow.       Patient talk to her insurance company and they told her that she needs a referral to be placed before her surgery tomorrow at 11:30.     Patient is going to the [facility] in [place] with Dr. Anderson Dodson     Patient needs referral. Pended    Call back number: [phone]    Savannah GAXIOLAN, RN   [facility]

## 2019-02-05 ENCOUNTER — TELEPHONE (OUTPATIENT)
Dept: OBGYN | Facility: CLINIC | Age: 29
End: 2019-02-05

## 2019-02-05 NOTE — TELEPHONE ENCOUNTER
Informed patient that spoke with location multiple times but they were unsure which other referral was needed for the surgery center. Md did sign the referral just waiting for them to fax it to them and she will recieved a call when com pelted.       Patient now requesting a physical therapy referral to [facility].     Routing to PCP for further review/recommendations/orders.        Savannah RAHMAN, RN   [facility]

## 2019-02-05 NOTE — TELEPHONE ENCOUNTER
Please advise patient to get a physical therapy referral from his orthopedic.  Follow-up in the clinic after surgery with we will can review and see if it is needed.

## 2019-02-05 NOTE — TELEPHONE ENCOUNTER
Routing to team 1 to Fax Referral : [phone]. Thank you      Please call patient when completed.   Ph: [phone] detailed message evan RAHMAN, RN   [facility]

## 2019-02-05 NOTE — TELEPHONE ENCOUNTER
S/w pt and gave md reply about referral to PT should come from ortho.    Pt states understanding.    Flora SMITH RN  EP Triage

## 2019-02-05 NOTE — TELEPHONE ENCOUNTER
Pt having surgery today for broken wrist  Asking if she can take her OCP prior to surgery to stay on schedule.  Yes, she can take the pill with sips of water if ok with her surgeon and anesthesia  Pt verbalized understanding and no further questions.

## 2019-02-05 NOTE — TELEPHONE ENCOUNTER
Patient calling again. She stated she needs a 3rd referral for physical therapy - [facility].   She was told by her insurance, she needs a referral for any place she goes to.    Please call patient to notify when this is all complete.  [phone] (home)       Thank you  Gracie Oseguera

## 2019-02-05 NOTE — TELEPHONE ENCOUNTER
Routing to team 1 to fax referrals.     Orthopedics referral gets faxed to [phone]    Physical therapy referral gets faxed to [phone].      Spoke with patient about information below from Dr. Shaw.   Please advise patient to get a physical therapy referral from his orthopedic.  Follow-up in the clinic after surgery with we will can review and see if it is needed.  Explained he did place one but advising her to still speak with her orthopedic surgeon.   Patient stated an understanding and agreed with plan.      Savannah GAXIOLAN, RN   [facility]

## 2019-02-05 NOTE — TELEPHONE ENCOUNTER
Both printed and faxed as requested from patient.      .Krystal DONIS    [facility] [place]

## 2019-02-18 ENCOUNTER — TRANSFERRED RECORDS (OUTPATIENT)
Dept: HEALTH INFORMATION MANAGEMENT | Facility: CLINIC | Age: 29
End: 2019-02-18

## 2019-03-18 ENCOUNTER — TRANSFERRED RECORDS (OUTPATIENT)
Dept: HEALTH INFORMATION MANAGEMENT | Facility: CLINIC | Age: 29
End: 2019-03-18

## 2019-05-07 ENCOUNTER — OFFICE VISIT (OUTPATIENT)
Dept: FAMILY MEDICINE | Facility: CLINIC | Age: 29
End: 2019-05-07
Payer: COMMERCIAL

## 2019-05-07 VITALS
WEIGHT: 270 LBS | BODY MASS INDEX: 43.39 KG/M2 | DIASTOLIC BLOOD PRESSURE: 80 MMHG | SYSTOLIC BLOOD PRESSURE: 130 MMHG | HEART RATE: 106 BPM | HEIGHT: 66 IN | TEMPERATURE: 98.7 F

## 2019-05-07 DIAGNOSIS — B36.0 TINEA VERSICOLOR: Primary | ICD-10-CM

## 2019-05-07 DIAGNOSIS — M62.838 NECK MUSCLE SPASM: ICD-10-CM

## 2019-05-07 PROCEDURE — 99213 OFFICE O/P EST LOW 20 MIN: CPT | Performed by: NURSE PRACTITIONER

## 2019-05-07 RX ORDER — FLUCONAZOLE 150 MG/1
300 TABLET ORAL WEEKLY
Qty: 4 TABLET | Refills: 0 | Status: SHIPPED | OUTPATIENT
Start: 2019-05-07 | End: 2019-09-11

## 2019-05-07 RX ORDER — CYCLOBENZAPRINE HCL 10 MG
10 TABLET ORAL 3 TIMES DAILY PRN
Qty: 30 TABLET | Refills: 0 | Status: SHIPPED | OUTPATIENT
Start: 2019-05-07 | End: 2020-05-04

## 2019-05-07 ASSESSMENT — MIFFLIN-ST. JEOR: SCORE: 1971.46

## 2019-05-07 NOTE — PROGRESS NOTES
SUBJECTIVE:                                                      Estefania Barrios is a 28 year old female who presents to clinic today for the following health issues:    Back Pain       Duration: 2 weeks         Specific cause: lifting patients, works in healthcare     Description:   Location of pain: low back bilateral and neck   Character of pain: dull ache and cramping  Pain radiation:radiates into the right buttocks and radiates into the left buttocks  New numbness or weakness in legs, not attributed to pain:  no     Intensity: Currently 5/10, moderate    History:   Pain interferes with job: No,   History of back problems: no prior back problems  Any previous MRI or X-rays: None  Sees a specialist for back pain:  No  Therapies tried without relief: cold, heat and NSAIDs    Alleviating factors:   Improved by: NSAIDs      Precipitating factors:  Worsened by: more stiff in the am       Accompanying Signs & Symptoms:  Risk of Fracture:  None  Risk of Cauda Equina:  None  Risk of Infection:  None  Risk of Cancer:  None  Risk of Ankylosing Spondylitis:  Onset at age <35, male, AND morning back stiffness. no     HPI: Estefania presents today with two concerns. First, she is experiencing a flare of a chronic back and neck issue. She has long struggled with periodic low back pain and upper back / neck / shoulder stiffness and spasm. 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 No new or concerning features compared with past episodes. Historically, rest, ibuprofen, and Flexeril have contributed to timely resolution of this issue.     Second, Estefania suffers from tinea versicolor. She is experiencing the early signs of a resurgence of this issue (which she typically suffers during seasonal transitions  "into warm / humid months). The issue is primarily over her back. She usually utilizes fluconazole x 1 every few weeks, and this generally leads to improvement / resolution. She denies pregnancy / possibility of pregnancy.     Problem list and histories reviewed & adjusted, as indicated.  Additional history: as documented    Reviewed and updated as needed this visit by clinical staff  Tobacco  Allergies  Meds  Problems  Med Hx  Surg Hx  Fam Hx       Reviewed and updated as needed this visit by Provider  Tobacco  Allergies  Meds  Problems  Med Hx  Surg Hx  Fam Hx         ROS:  Constitutional, musculoskeletal, neuro, skin systems are negative, except as otherwise noted.    OBJECTIVE:                                                      /80 (BP Location: Right arm, Patient Position: Chair, Cuff Size: Adult Large)   Pulse 106   Temp 98.7  F (37.1  C) (Tympanic)   Ht 1.676 m (5' 6\")   Wt 122.5 kg (270 lb)   LMP 12/24/2018   BMI 43.58 kg/m   Body mass index is 43.58 kg/m .   GENERAL: healthy, alert, well nourished, well hydrated, no distress  MS: extremities- no gross deformities noted, no edema  SKIN: Pigmentation irregularities over mid back. Appearance consistent with very early tinea versicolor.  NEURO: strength and tone- normal, sensory exam- grossly normal, mentation- intact, speech- normal, reflexes- symmetric    Diagnostic test results:  none     ASSESSMENT/PLAN:                                                      Estefania was seen today for back pain and derm problem. Given that these are chronic issues for her that respond favorably to established treatments, will order these medications for her today. No red flags or symptoms out of character for her today. Discussed reasons to call or return to clinic. Estefania acknowledges and demonstrates understanding of circumstances under which care should be sought urgently or emergently. Follow up as discussed. Discussed risks, benefits, " alternatives, potential side effects, and proper administration of new medication / treatment. Agrees with plan of care. All questions answered.     Diagnoses and all orders for this visit:    Tinea versicolor  -     fluconazole (DIFLUCAN) 150 MG tablet; Take 2 tablets (300 mg) by mouth once a week for 2 doses    Neck muscle spasm  -     cyclobenzaprine (FLEXERIL) 10 MG tablet; Take 1 tablet (10 mg) by mouth 3 times daily as needed for muscle spasms      Risks, benefits and alternatives of treatments discussed. Plan agreed upon and all questions answered.      Follow-Up: Return in about 6 months (around 11/7/2019) for Physical Exam.    See Patient Instructions      TONY Lugo, CNP

## 2019-07-24 ENCOUNTER — MYC REFILL (OUTPATIENT)
Dept: OBGYN | Facility: CLINIC | Age: 29
End: 2019-07-24

## 2019-07-24 DIAGNOSIS — Z30.011 OCP (ORAL CONTRACEPTIVE PILLS) INITIATION: ICD-10-CM

## 2019-07-24 RX ORDER — LEVONORGESTREL/ETHIN.ESTRADIOL 0.1-0.02MG
1 TABLET ORAL DAILY
Qty: 84 TABLET | Refills: 1 | Status: SHIPPED | OUTPATIENT
Start: 2019-07-24 | End: 2019-11-23

## 2019-07-24 NOTE — TELEPHONE ENCOUNTER
"Requested Prescriptions   Pending Prescriptions Disp Refills     levonorgestrel-ethinyl estradiol (AVIANE/ALESSE/LESSINA) 0.1-20 MG-MCG tablet 84 tablet 3     Sig: Take 1 tablet by mouth daily       Contraceptives Protocol Passed - 7/24/2019  7:57 AM        Passed - Patient is not a current smoker if age is 35 or older        Passed - Recent (12 mo) or future (30 days) visit within the authorizing provider's specialty     Patient had office visit in the last 12 months or has a visit in the next 30 days with authorizing provider or within the authorizing provider's specialty.  See \"Patient Info\" tab in inbasket, or \"Choose Columns\" in Meds & Orders section of the refill encounter.              Passed - Medication is active on med list        Passed - No active pregnancy on record        Passed - No positive pregnancy test in past 12 months        Last Written Prescription Date:  11/7/18  Last Fill Quantity: 84,  # refills: 3   Last office visit: 12/5/2018 with prescribing provider:  Dr Anderson     Prescription approved per [facility] Refill Protocol.      "

## 2019-08-28 ENCOUNTER — OFFICE VISIT (OUTPATIENT)
Dept: FAMILY MEDICINE | Facility: CLINIC | Age: 29
End: 2019-08-28
Payer: COMMERCIAL

## 2019-08-28 VITALS
TEMPERATURE: 97.9 F | WEIGHT: 273 LBS | DIASTOLIC BLOOD PRESSURE: 78 MMHG | OXYGEN SATURATION: 98 % | BODY MASS INDEX: 44.06 KG/M2 | HEART RATE: 115 BPM | SYSTOLIC BLOOD PRESSURE: 134 MMHG

## 2019-08-28 DIAGNOSIS — F40.10 SOCIAL ANXIETY DISORDER: Primary | ICD-10-CM

## 2019-08-28 PROCEDURE — 99213 OFFICE O/P EST LOW 20 MIN: CPT | Performed by: NURSE PRACTITIONER

## 2019-08-28 RX ORDER — PROPRANOLOL HYDROCHLORIDE 40 MG/1
40 TABLET ORAL DAILY PRN
Qty: 60 TABLET | Refills: 1 | Status: SHIPPED | OUTPATIENT
Start: 2019-08-28 | End: 2019-12-24

## 2019-08-28 ASSESSMENT — PATIENT HEALTH QUESTIONNAIRE - PHQ9
SUM OF ALL RESPONSES TO PHQ QUESTIONS 1-9: 3
5. POOR APPETITE OR OVEREATING: SEVERAL DAYS

## 2019-08-28 ASSESSMENT — ANXIETY QUESTIONNAIRES
5. BEING SO RESTLESS THAT IT IS HARD TO SIT STILL: NOT AT ALL
IF YOU CHECKED OFF ANY PROBLEMS ON THIS QUESTIONNAIRE, HOW DIFFICULT HAVE THESE PROBLEMS MADE IT FOR YOU TO DO YOUR WORK, TAKE CARE OF THINGS AT HOME, OR GET ALONG WITH OTHER PEOPLE: SOMEWHAT DIFFICULT
2. NOT BEING ABLE TO STOP OR CONTROL WORRYING: SEVERAL DAYS
6. BECOMING EASILY ANNOYED OR IRRITABLE: SEVERAL DAYS
GAD7 TOTAL SCORE: 9
7. FEELING AFRAID AS IF SOMETHING AWFUL MIGHT HAPPEN: SEVERAL DAYS
1. FEELING NERVOUS, ANXIOUS, OR ON EDGE: NEARLY EVERY DAY
3. WORRYING TOO MUCH ABOUT DIFFERENT THINGS: MORE THAN HALF THE DAYS

## 2019-08-28 NOTE — PROGRESS NOTES
Subjective     Estefania Barrios is a 29 year old female who presents to clinic today for the following health issues:    HPI   Concern - Pt  Has been seeing a therapist and is inquiring about possibly starting propranolol, has tried anti depressants before, does not want to start those again.     HPI: Estefania presents today with the complaint of social anxiety. She has been working with a therapist lately for anxiety symptoms, and he/she seems to think that her symptoms are elicited / amplified within social situations. She does not wish to start a daily medication at this point. She has tried SSRIs in the past, and she didn't like how they made her feel. She would be interested in a PRN option (non-benzo), however. TSH was normal in 2018.    Patient Active Problem List   Diagnosis     Obesity     Morbid obesity (H)     ASCUS with positive high risk HPV cervical     Past Surgical History:   Procedure Laterality Date     NO HISTORY OF SURGERY         Social History     Tobacco Use     Smoking status: Former Smoker     Smokeless tobacco: Never Used     Tobacco comment: quit 2015   Substance Use Topics     Alcohol use: Yes     Family History   Problem Relation Age of Onset     Hypertension Mother      Diabetes Mother      Heart Disease Mother      Diabetes Father         type 2            Reviewed and updated as needed this visit by Provider  Tobacco  Allergies  Meds  Problems  Med Hx  Surg Hx  Fam Hx         Review of Systems   ROS COMP: Constitutional, neuro and psych systems are negative, except as otherwise noted.      Objective    /78   Pulse 115   Temp 97.9  F (36.6  C) (Tympanic)   Wt 123.8 kg (273 lb)   SpO2 98%   BMI 44.06 kg/m    Body mass index is 44.06 kg/m .  Physical Exam   GENERAL: healthy, alert and no distress  NEURO: Normal strength and tone, mentation intact and speech normal  PSYCH: mentation appears normal, affect normal/bright    NICOL-7 SCORE 11/6/2018 8/28/2019   Total Score 7 9        Diagnostic Test Results:  none         Assessment & Plan     Estefania was seen today for medication request.    Diagnoses and all orders for this visit:    Social anxiety disorder  Comment: After discussing options, we decided together to try propranolol initially. She will keep me posted regarding her response and tolerance. Discussed reasons to call or return to clinic. Estefania acknowledges and demonstrates understanding of circumstances under which care should be sought urgently or emergently. Follow up as discussed. Discussed risks, benefits, alternatives, potential side effects, and proper administration of new medication / treatment. Agrees with plan of care. All questions answered.   -     propranolol (INDERAL) 40 MG tablet; Take 1 tablet (40 mg) by mouth daily as needed (social anxiety) Take 1 hour before anxiety-provoking situation.      See Patient Instructions    Return in about 3 months (around 11/28/2019) for persistent or worsening symptoms.    Jovani Alvarez NP  [facility]

## 2019-08-28 NOTE — PATIENT INSTRUCTIONS
Let me know how things are going with propranolol    Let me know if you end up needing a skin care referral

## 2019-08-29 ASSESSMENT — ANXIETY QUESTIONNAIRES: GAD7 TOTAL SCORE: 9

## 2019-09-11 DIAGNOSIS — B36.0 TINEA VERSICOLOR: ICD-10-CM

## 2019-09-11 RX ORDER — FLUCONAZOLE 150 MG/1
300 TABLET ORAL WEEKLY
Qty: 4 TABLET | Refills: 0 | Status: SHIPPED | OUTPATIENT
Start: 2019-09-11 | End: 2020-11-30

## 2019-09-11 NOTE — TELEPHONE ENCOUNTER
"Last Written Prescription Date: 5/7/19-5/15/19  Last Fill Quantity: 4,  # refills: 0  Last office visit: 8/28/2019 with prescribing provider:  Antonio  Future Office Visit:      Requested Prescriptions   Pending Prescriptions Disp Refills     fluconazole (DIFLUCAN) 150 MG tablet 4 tablet 0     Sig: Take 2 tablets (300 mg) by mouth once a week       Antifungal Agents Failed - 9/11/2019  4:46 PM        Failed - Not Fluconazole or Terconazole      If oral Fluconazole or Terconazole, may refill if indicated in progress notes.           Failed - Medication is active on med list        Passed - Recent (12 mo) or future (30 days) visit within the authorizing provider's specialty     Patient had office visit in the last 12 months or has a visit in the next 30 days with authorizing provider or within the authorizing provider's specialty.  See \"Patient Info\" tab in inbasket, or \"Choose Columns\" in Meds & Orders section of the refill encounter.                "

## 2019-09-11 NOTE — TELEPHONE ENCOUNTER
Routing refill request to provider for review/approval because:  Drug not active on patient's medication list  Patient was seen by NP on 5/7/19 and prescribed this medication. End date was 5/15/19 for Tinea Versicolor.    Carrie Patino RN, BSN  [facility] [place] [place]

## 2019-10-03 ENCOUNTER — OFFICE VISIT (OUTPATIENT)
Dept: FAMILY MEDICINE | Facility: CLINIC | Age: 29
End: 2019-10-03
Payer: COMMERCIAL

## 2019-10-03 VITALS — SYSTOLIC BLOOD PRESSURE: 122 MMHG | DIASTOLIC BLOOD PRESSURE: 74 MMHG

## 2019-10-03 DIAGNOSIS — L91.8 SKIN TAGS, MULTIPLE ACQUIRED: ICD-10-CM

## 2019-10-03 DIAGNOSIS — L81.4 LENTIGINES: ICD-10-CM

## 2019-10-03 DIAGNOSIS — D22.9 MULTIPLE NEVI: ICD-10-CM

## 2019-10-03 DIAGNOSIS — B36.0 TINEA VERSICOLOR: Primary | ICD-10-CM

## 2019-10-03 DIAGNOSIS — L82.1 SEBORRHEIC KERATOSES: ICD-10-CM

## 2019-10-03 PROCEDURE — 99214 OFFICE O/P EST MOD 30 MIN: CPT | Performed by: PHYSICIAN ASSISTANT

## 2019-10-03 RX ORDER — KETOCONAZOLE 20 MG/ML
SHAMPOO TOPICAL
Qty: 120 ML | Refills: 11 | Status: SHIPPED | OUTPATIENT
Start: 2019-10-03 | End: 2020-11-30

## 2019-10-03 NOTE — LETTER
10/3/2019         RE: Estefania Barrios  [id number] [address] [address]        Dear Colleague,    Thank you for referring your patient, Estefania Barrios, to the [facility]. Please see a copy of my visit note below.    HPI:  I was asked to see pt by Dr. Shaw. Estefania Barrios is a 29 year old female patient here today for FBE. Has many moles .  Patient states this has been present for years.  Patient reports the following symptoms: none .  Patient reports the following previous treatments: none.  Patient reports the following modifying factors: none.  Associated symptoms: none. Also develops a rash yearly in the summer. Not present today. Usually treats with diflucan with great control.  Patient has no other skin complaints today.  Remainder of the HPI, Meds, PMH, Allergies, FH, and SH was reviewed in chart.    Pertinent Hx:   No personal or family history of skin cancer    Past Medical History:   Diagnosis Date     Anxiety      ASCUS with positive high risk HPV cervical 2017     Obesity        Past Surgical History:   Procedure Laterality Date     NO HISTORY OF SURGERY          Family History   Problem Relation Age of Onset     Hypertension Mother      Diabetes Mother      Heart Disease Mother      Diabetes Father         type 2        Social History     Socioeconomic History     Marital status:      Spouse name: Not on file     Number of children: 0     Years of education: Not on file     Highest education level: Not on file   Occupational History     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   Social Needs     Financial resource strain: Not on file     Food insecurity:     Worry: Not on file     Inability: Not on file     Transportation needs:     Medical: Not on file     Non-medical: Not on file   Tobacco Use     Smoking status: Former Smoker     Smokeless tobacco: Never Used     Tobacco comment: quit 2015   Substance and Sexual Activity     Alcohol  use: Yes     Drug use: No     Sexual activity: Yes     Partners: Male     Birth control/protection: Condom   Lifestyle     Physical activity:     Days per week: Not on file     Minutes per session: Not on file     Stress: Not on file   Relationships     Social connections:     Talks on phone: Not on file     Gets together: Not on file     Attends Alevism service: Not on file     Active member of club or organization: Not on file     Attends meetings of clubs or organizations: Not on file     Relationship status: Not on file     Intimate partner violence:     Fear of current or ex partner: Not on file     Emotionally abused: Not on file     Physically abused: Not on file     Forced sexual activity: Not on file   Other Topics Concern     Parent/sibling w/ CABG, MI or angioplasty before 65F 55M? Not Asked   Social History Narrative     Not on file       Outpatient Encounter Medications as of 10/3/2019   Medication Sig Dispense Refill     cyclobenzaprine (FLEXERIL) 10 MG tablet Take 1 tablet (10 mg) by mouth 3 times daily as needed for muscle spasms 30 tablet 0     fluconazole (DIFLUCAN) 150 MG tablet Take 2 tablets (300 mg) by mouth once a week 4 tablet 0     ketoconazole (NIZORAL) 2 % external shampoo Wet affected area once a week starting early spring, apply shampoo and lather, let sit for 3-5 minutes and then rinse. 120 mL 11     levonorgestrel-ethinyl estradiol (AVIANE/ALESSE/LESSINA) 0.1-20 MG-MCG tablet Take 1 tablet by mouth daily Continuously 84 tablet 1     propranolol (INDERAL) 40 MG tablet Take 1 tablet (40 mg) by mouth daily as needed (social anxiety) Take 1 hour before anxiety-provoking situation. 60 tablet 1     No facility-administered encounter medications on file as of 10/3/2019.        Review Of Systems:  Skin: As above  Eyes: negative  Ears/Nose/Throat: negative  Respiratory: No shortness of breath, dyspnea on exertion, cough, or hemoptysis  Cardiovascular: negative  Gastrointestinal:  negative  Genitourinary: negative  Musculoskeletal: negative  Neurologic: negative  Psychiatric: negative  Hematologic/Lymphatic/Immunologic: negative  Endocrine: negative      Objective:     /74   Eyes: Conjunctivae/lids: Normal   ENT: Lips:  Normal  MSK: Normal  Cardiovascular: Peripheral edema none  Pulm: Breathing Normal  Neuro/Psych: Orientation: Normal; Mood/Affect: Normal, NAD, WDWN  Pt accompanied by: self  Following areas examined: Scalp, face, eyelids, lips, neck, chest, abdomen, back, buttocks, and R&L upper and lower extremities. Pt defers exam of groin and genitals.   Vrik skin type:i   Findings:  Brown, stuck-on scaly appearing papules on neck  Well circumscribed macules with symmetric color distribution on trunk and extremities.  Tan WD smooth macules on face, neck, trunk, and extremities.  Inflamed flesh-colored smooth pedunculated papules on neck line and axillae.     Assessment and Plan:     1) Seborrheic keratoses, Benign nevi, Lentigines     I discussed the specifics of tumor, prognosis, and genetics of benign lesions.  I explained that treatment of these lesions would be purely cosmetic and not medically neccessary.  I discussed with patient different removal options including excision, cryotherapy, cautery and /or laser.  Lesion may recur and/or may not completely resolve. May need additional treatment.     2) irritated skin tags  Pt defers tx today  3) tinea versicolor  Disc diflucan and nizoral  Not flaring today  Nizroal: Wet affected area once a week starting in the spring and continuing throughout summer, apply shampoo and lather, let sit for 3-5 minutes and then rinse.   Signs and Symptoms of non-melanoma skin cancer and ABCDEs of melanoma reviewed with patient. Patient encouraged to perform monthly self skin exams and educated on how to perform them. UV precautions reviewed with patient. Patient was asked about new or changing moles/lesions on body.   Wear a sunscreen with  at least SPF 30 on your face, ears, neck and V of the chest daily. Wear sunscreen on other areas of the body if those areas are exposed to the sun throughout the day. Sunscreens can contain physical and/or chemical blockers. Physical blockers are less likely to clog pores, these include zinc oxide and titanium dioxide. Reapply every two hour and after swimming. Sunscreen examples include Neutrogena, CeraVe, Blue Lizard, Elta MD and many others.    Proper skin care from [facility]:    -Eliminate harsh soaps as they strip the natural oils from the skin, often resulting in dry itchy skin ( i.e. Dial, Zest, Cheryl Spring)  -Use mild soaps such as Cetaphil or Dove Sensitive Skin in the shower. You do not need to use soap on arms, legs, and trunk every time you shower unless visibly soiled.   -Avoid hot or cold showers.  -After showering, lightly dry off and apply moisturizing within 2-3 minutes. This will help trap moisture in the skin.   -Aggressive use of a moisturizer at least 1-2 times a day to the entire body (including -Vanicream, Cetaphil, Aquaphor or Cerave) and moisturize hands after every washing.  -We recommend using moisturizers that come in a tub that needs to be scooped out, not a pump. This has more of an oil base. It will hold moisture in your skin much better than a water base moisturizer. The above recommended are non-pore clogging.               Follow up in yearly FBE       Again, thank you for allowing me to participate in the care of your patient.        Sincerely,        Lacey Landin PA-C

## 2019-10-03 NOTE — PATIENT INSTRUCTIONS
Proper skin care from [facility]:    -Eliminate harsh soaps as they strip the natural oils from the skin, often resulting in dry itchy skin ( i.e. Dial, Zest, Cheryl Spring)  -Use mild soaps such as Cetaphil or Dove Sensitive Skin in the shower. You do not need to use soap on arms, legs, and trunk every time you shower unless visibly soiled.   -Avoid hot or cold showers.  -After showering, lightly dry off and apply moisturizing within 2-3 minutes. This will help trap moisture in the skin.   -Aggressive use of a moisturizer at least 1-2 times a day to the entire body (including -Vanicream, Cetaphil, Aquaphor or Cerave) and moisturize hands after every washing.  -We recommend using moisturizers that come in a tub that needs to be scooped out, not a pump. This has more of an oil base. It will hold moisture in your skin much better than a water base moisturizer. The above recommended are non-pore clogging.      Wear a sunscreen with at least SPF 30 on your face, ears, neck and V of the chest daily. Wear sunscreen on other areas of the body if those areas are exposed to the sun throughout the day. Sunscreens can contain physical and/or chemical blockers. Physical blockers are less likely to clog pores, these include zinc oxide and titanium dioxide. Reapply every two hour and after swimming. Sunscreen examples include Neutrogena, CeraVe, Blue Lizard, Elta MD and many others.    UV radiation  UVA radiation remains constant throughout the day and throughout the year. It is a longer wavelength than UVB and therefore penetrates deeper into the skin leading to immediate and delayed tanning, photoaging, and skin cancer. 70-80% of UVA and UVB radiation occurs between the hours of 10am-2pm.  UVB radiation  UVB radiation causes the most harmful effects and is more significant during the summer months. However, snow and ice can reflect UVB radiation leading to skin damage during the winter months as well. UVB radiation is  responsible for tanning, burning, inflammation, delayed erythema (pinkness), pigmentation (brown spots), and skin cancer.     Nizroal: Wet affected area once a week starting in the spring and continuing throughout summer, apply shampoo and lather, let sit for 3-5 minutes and then rinse.   [email or web address]

## 2019-10-03 NOTE — PROGRESS NOTES
HPI:  I was asked to see pt by Dr. Shaw. Estefania Barrios is a 29 year old female patient here today for FBE. Has many moles .  Patient states this has been present for years.  Patient reports the following symptoms: none .  Patient reports the following previous treatments: none.  Patient reports the following modifying factors: none.  Associated symptoms: none. Also develops a rash yearly in the summer. Not present today. Usually treats with diflucan with great control.  Patient has no other skin complaints today.  Remainder of the HPI, Meds, PMH, Allergies, FH, and SH was reviewed in chart.    Pertinent Hx:   No personal or family history of skin cancer    Past Medical History:   Diagnosis Date     Anxiety      ASCUS with positive high risk HPV cervical 2017     Obesity        Past Surgical History:   Procedure Laterality Date     NO HISTORY OF SURGERY          Family History   Problem Relation Age of Onset     Hypertension Mother      Diabetes Mother      Heart Disease Mother      Diabetes Father         type 2        Social History     Socioeconomic History     Marital status:      Spouse name: Not on file     Number of children: 0     Years of education: Not on file     Highest education level: Not on file   Occupational History     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   Social Needs     Financial resource strain: Not on file     Food insecurity:     Worry: Not on file     Inability: Not on file     Transportation needs:     Medical: Not on file     Non-medical: Not on file   Tobacco Use     Smoking status: Former Smoker     Smokeless tobacco: Never Used     Tobacco comment: quit 2015   Substance and Sexual Activity     Alcohol use: Yes     Drug use: No     Sexual activity: Yes     Partners: Male     Birth control/protection: Condom   Lifestyle     Physical activity:     Days per week: Not on file     Minutes per session: Not on file     Stress: Not on file    Relationships     Social connections:     Talks on phone: Not on file     Gets together: Not on file     Attends Mu-ism service: Not on file     Active member of club or organization: Not on file     Attends meetings of clubs or organizations: Not on file     Relationship status: Not on file     Intimate partner violence:     Fear of current or ex partner: Not on file     Emotionally abused: Not on file     Physically abused: Not on file     Forced sexual activity: Not on file   Other Topics Concern     Parent/sibling w/ CABG, MI or angioplasty before 65F 55M? Not Asked   Social History Narrative     Not on file       Outpatient Encounter Medications as of 10/3/2019   Medication Sig Dispense Refill     cyclobenzaprine (FLEXERIL) 10 MG tablet Take 1 tablet (10 mg) by mouth 3 times daily as needed for muscle spasms 30 tablet 0     fluconazole (DIFLUCAN) 150 MG tablet Take 2 tablets (300 mg) by mouth once a week 4 tablet 0     ketoconazole (NIZORAL) 2 % external shampoo Wet affected area once a week starting early spring, apply shampoo and lather, let sit for 3-5 minutes and then rinse. 120 mL 11     levonorgestrel-ethinyl estradiol (AVIANE/ALESSE/LESSINA) 0.1-20 MG-MCG tablet Take 1 tablet by mouth daily Continuously 84 tablet 1     propranolol (INDERAL) 40 MG tablet Take 1 tablet (40 mg) by mouth daily as needed (social anxiety) Take 1 hour before anxiety-provoking situation. 60 tablet 1     No facility-administered encounter medications on file as of 10/3/2019.        Review Of Systems:  Skin: As above  Eyes: negative  Ears/Nose/Throat: negative  Respiratory: No shortness of breath, dyspnea on exertion, cough, or hemoptysis  Cardiovascular: negative  Gastrointestinal: negative  Genitourinary: negative  Musculoskeletal: negative  Neurologic: negative  Psychiatric: negative  Hematologic/Lymphatic/Immunologic: negative  Endocrine: negative      Objective:     /74   Eyes: Conjunctivae/lids: Normal   ENT:  Lips:  Normal  MSK: Normal  Cardiovascular: Peripheral edema none  Pulm: Breathing Normal  Neuro/Psych: Orientation: Normal; Mood/Affect: Normal, NAD, WDWN  Pt accompanied by: self  Following areas examined: Scalp, face, eyelids, lips, neck, chest, abdomen, back, buttocks, and R&L upper and lower extremities. Pt defers exam of groin and genitals.   Virk skin type:i   Findings:  Brown, stuck-on scaly appearing papules on neck  Well circumscribed macules with symmetric color distribution on trunk and extremities.  Tan WD smooth macules on face, neck, trunk, and extremities.  Inflamed flesh-colored smooth pedunculated papules on neck line and axillae.     Assessment and Plan:     1) Seborrheic keratoses, Benign nevi, Lentigines     I discussed the specifics of tumor, prognosis, and genetics of benign lesions.  I explained that treatment of these lesions would be purely cosmetic and not medically neccessary.  I discussed with patient different removal options including excision, cryotherapy, cautery and /or laser.  Lesion may recur and/or may not completely resolve. May need additional treatment.     2) irritated skin tags  Pt defers tx today  3) tinea versicolor  Disc diflucan and nizoral  Not flaring today  Nizroal: Wet affected area once a week starting in the spring and continuing throughout summer, apply shampoo and lather, let sit for 3-5 minutes and then rinse.   Signs and Symptoms of non-melanoma skin cancer and ABCDEs of melanoma reviewed with patient. Patient encouraged to perform monthly self skin exams and educated on how to perform them. UV precautions reviewed with patient. Patient was asked about new or changing moles/lesions on body.   Wear a sunscreen with at least SPF 30 on your face, ears, neck and V of the chest daily. Wear sunscreen on other areas of the body if those areas are exposed to the sun throughout the day. Sunscreens can contain physical and/or chemical blockers. Physical blockers  are less likely to clog pores, these include zinc oxide and titanium dioxide. Reapply every two hour and after swimming. Sunscreen examples include Neutrogena, CeraVe, Blue Lizard, Elta MD and many others.    Proper skin care from [facility]:    -Eliminate harsh soaps as they strip the natural oils from the skin, often resulting in dry itchy skin ( i.e. Dial, Zest, Cheryl Spring)  -Use mild soaps such as Cetaphil or Dove Sensitive Skin in the shower. You do not need to use soap on arms, legs, and trunk every time you shower unless visibly soiled.   -Avoid hot or cold showers.  -After showering, lightly dry off and apply moisturizing within 2-3 minutes. This will help trap moisture in the skin.   -Aggressive use of a moisturizer at least 1-2 times a day to the entire body (including -Vanicream, Cetaphil, Aquaphor or Cerave) and moisturize hands after every washing.  -We recommend using moisturizers that come in a tub that needs to be scooped out, not a pump. This has more of an oil base. It will hold moisture in your skin much better than a water base moisturizer. The above recommended are non-pore clogging.               Follow up in yearly FBE

## 2019-10-24 ENCOUNTER — TRANSFERRED RECORDS (OUTPATIENT)
Dept: HEALTH INFORMATION MANAGEMENT | Facility: CLINIC | Age: 29
End: 2019-10-24

## 2019-11-23 ENCOUNTER — MYC REFILL (OUTPATIENT)
Dept: OBGYN | Facility: CLINIC | Age: 29
End: 2019-11-23

## 2019-11-23 DIAGNOSIS — Z30.011 OCP (ORAL CONTRACEPTIVE PILLS) INITIATION: ICD-10-CM

## 2019-11-25 RX ORDER — LEVONORGESTREL/ETHIN.ESTRADIOL 0.1-0.02MG
1 TABLET ORAL DAILY
Qty: 84 TABLET | Refills: 0 | Status: SHIPPED | OUTPATIENT
Start: 2019-11-25 | End: 2019-12-24

## 2019-11-25 NOTE — TELEPHONE ENCOUNTER
"Requested Prescriptions   Pending Prescriptions Disp Refills     levonorgestrel-ethinyl estradiol (AVIANE/ALESSE/LESSINA) 0.1-20 MG-MCG tablet 84 tablet 1     Sig: Take 1 tablet by mouth daily Continuously       Contraceptives Protocol Passed - 11/23/2019  5:52 PM        Passed - Patient is not a current smoker if age is 35 or older        Passed - Recent (12 mo) or future (30 days) visit within the authorizing provider's specialty     Patient has had an office visit with the authorizing provider or a provider within the authorizing providers department within the previous 12 mos or has a future within next 30 days. See \"Patient Info\" tab in inbasket, or \"Choose Columns\" in Meds & Orders section of the refill encounter.              Passed - Medication is active on med list        Passed - No active pregnancy on record        Passed - No positive pregnancy test in past 12 months        Prescription approved per [facility].  Christine Valadez RN on 11/25/2019 at 8:35 AM    "

## 2019-12-23 NOTE — PROGRESS NOTES
Estefania is a 29 year old  female who presents for annual exam.     Besides routine health maintenance,  she would like to discuss increased anxiety.    HPI:  Here today for yearly exam --doing well.  Amenorrheic with continuous OCPs.  Still unsure how much her OCPs contribute to her anxiety and general overall wellbeing but not interested in other types of contraception at this time.  Likely never interested in having children and  agrees.  Have discussed/considered permanent sterilzation.  Has heard bad things about IUDs.  +SA --no issues.  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 Chaves at [facility]; had bloodwork last year but would like fasting labs today  -hx anxiety --has been on meds in the past but not interested in restarting; has been seeing counselor on weekly basis with some improvement  -had flu shot through work      GYNECOLOGIC HISTORY:    Patient's last menstrual period was 2018 (exact date).    Regular menses? yes  Continuous Birth control    Her current contraception method is: oral contraceptives.  She  reports that she has quit smoking. She has never used smokeless tobacco.    Patient is sexually active.  STD testing offered?  Declined  Last PHQ-9 score on record =   PHQ-9 SCORE 2019   PHQ-9 Total Score 1     Last GAD7 score on record =   NICOL-7 SCORE 2019   Total Score 6     Alcohol Score = 3    HEALTH MAINTENANCE:  Cholesterol: (No results found for: CHOL   Last Mammo: Not applicable, Result: Not applicable, Next Mammo: Due at age 40   Pap:   Lab Results   Component Value Date    PAP ASC-US 2018 ASCUS  HPV other (+)pos  Colonoscopy:  NA, Result: Not applicable, Next Colonoscopy: NA years.  Dexa:  NA    Health maintenance updated:  yes    HISTORY:  OB History    Para Term  AB Living   0 0 0 0 0 0   SAB TAB Ectopic Multiple Live Births   0 0 0 0 0       Patient Active Problem List   Diagnosis     Obesity     Morbid obesity (H)     ASCUS with positive high risk HPV cervical     Uses oral contraception     Past Surgical History:   Procedure Laterality Date     WRIST SURGERY  2019    broke wrist      Social History     Tobacco Use     Smoking status: Former Smoker     Smokeless tobacco: Never Used     Tobacco comment: quit    Substance Use Topics     Alcohol use: Yes      Problem (# of Occurrences) Relation (Name,Age of Onset)    Diabetes (2) Mother, Father: type 2     Heart Disease (1) Mother    Hypertension (1) Mother    No Known Problems (6) Sister, Brother, Maternal Grandmother, Maternal Grandfather, Paternal Grandmother, Other            Current Outpatient Medications   Medication Sig     cyclobenzaprine (FLEXERIL) 10 MG tablet Take 1 tablet (10 mg) by mouth 3 times daily as needed for muscle spasms     levonorgestrel-ethinyl estradiol (AVIANE/ALESSE/LESSINA) 0.1-20 MG-MCG tablet Take 1 tablet by mouth daily Continuously     fluconazole (DIFLUCAN) 150 MG tablet Take 2 tablets (300 mg) by mouth once a week (Patient not taking: Reported on 2019)     ketoconazole (NIZORAL) 2 % external shampoo Wet affected area once a week starting early spring, apply shampoo and lather, let sit for 3-5 minutes and then rinse. (Patient not taking: Reported on 2019)     No current facility-administered medications for this visit.      No Known Allergies    Past medical, surgical, social and family histories were reviewed and updated in EPIC.    ROS:   12 point review of systems negative other than symptoms noted below or in the HPI.  Psychiatric: Anxiety  No urinary frequency or dysuria, bladder or  "kidney problems    EXAM:  /78   Ht 1.676 m (5' 6\")   Wt 124.3 kg (274 lb)   LMP 12/24/2018 (Exact Date)   Breastfeeding No   BMI 44.22 kg/m     BMI: Body mass index is 44.22 kg/m .    PHYSICAL EXAM:  Constitutional:   Appearance: Well nourished, well developed, alert, in no acute distress  Neck:  Lymph Nodes:  No lymphadenopathy present    Thyroid:  Gland size normal, nontender, no nodules or masses present  on palpation  Chest:  Respiratory Effort:  Breathing unlabored  Cardiovascular:    Heart: Auscultation:  Regular rate, normal rhythm, no murmurs present  Breasts: Inspection of Breasts:  No lymphadenopathy present., Palpation of Breasts and Axillae:  No masses present on palpation, no breast tenderness., Axillary Lymph Nodes:  No lymphadenopathy present. and No nodularity, asymmetry or nipple discharge bilaterally.  Gastrointestinal:   Abdominal Examination:  Abdomen nontender to palpation, tone normal without rigidity or guarding, no masses present, umbilicus without lesions   Liver and Spleen:  No hepatomegaly present, liver nontender to palpation    Hernias:  No hernias present  Lymphatic: Lymph Nodes:  No other lymphadenopathy present  Skin:  General Inspection:  No rashes present, no lesions present, no areas of  discoloration  Neurologic:    Mental Status:  Oriented X3.  Normal strength and tone, sensory exam                grossly normal, mentation intact and speech normal.    Psychiatric:   Mentation appears normal and affect normal/bright.         Pelvic Exam:  External Genitalia:     Normal appearance for age, no discharge present, no tenderness present, no inflammatory lesions present, color normal  Vagina:     Normal vaginal vault without central or paravaginal defects, no discharge present, no inflammatory lesions present, no masses present  Bladder:     Nontender to palpation  Urethra:   Urethral Body:  Urethra palpation normal, urethra structural support normal   Urethral Meatus:  No " erythema or lesions present  Cervix:     Appearance healthy, no lesions present, nontender to palpation, no bleeding present  Uterus:     Uterus: firm, normal sized and nontender, anteverted in position.   Adnexa:     No adnexal tenderness present, no adnexal masses present  Perineum:     Perineum within normal limits, no evidence of trauma, no rashes or skin lesions present  Anus:     Anus within normal limits, no hemorrhoids present  Inguinal Lymph Nodes:     No lymphadenopathy present  Pubic Hair:     Normal pubic hair distribution for age  Genitalia and Groin:     No rashes present, no lesions present, no areas of discoloration, no masses present      COUNSELING:   Reviewed preventive health counseling, as reflected in patient instructions  Special attention given to:        Regular exercise       Healthy diet/nutrition       Contraception       Family planning    BMI: Body mass index is 44.22 kg/m .  Weight management plan: Discussed healthy diet and exercise guidelines Patient was referred to their PCP to discuss a diet and exercise plan.    ASSESSMENT:  29 year old female with satisfactory annual exam.    ICD-10-CM    1. Encounter for gynecological examination without abnormal finding Z01.419 Pap imaged thin layer screen with HPV - recommended age 30 - 65     HPV High Risk Types DNA Cervical   2. Uses oral contraception Z30.41 levonorgestrel-ethinyl estradiol (AVIANE/ALESSE/LESSINA) 0.1-20 MG-MCG tablet   3. Encounter for lipid screening for cardiovascular disease Z13.220 Lipid panel reflex to direct LDL Fasting    Z13.6    4. Vitamin D deficiency E55.9 Vitamin D Deficiency   5. Screening for diabetes mellitus Z13.1 Glucose, whole blood       PLAN:  Patient Instructions   Follow up with your primary care provider for your other medical problems.  Continue self breast exam.  Increase physical activity and exercise.  PHQ-9/NICOL-7 scores were discussed.  Will continue working with her therapist on anxiety  issues.  Lab and pap smear results will be called to the patient.  Co-testing done today due to AXEL I last year; will repeat in 3yrs if negative.  Will need repeat colposcopy if abnormal.  Usual safety and preventative measures counseling done.  BMI >25  Weight loss encouraged.  Long discussion today regarding long term contraception options.  Discussed salpingectomy or vasectomy as permanent options.  Also discussed safety and convenience profile of IUDs.      Lashonda Anderson MD

## 2019-12-24 ENCOUNTER — OFFICE VISIT (OUTPATIENT)
Dept: OBGYN | Facility: CLINIC | Age: 29
End: 2019-12-24
Payer: COMMERCIAL

## 2019-12-24 VITALS
BODY MASS INDEX: 44.03 KG/M2 | DIASTOLIC BLOOD PRESSURE: 78 MMHG | WEIGHT: 274 LBS | SYSTOLIC BLOOD PRESSURE: 126 MMHG | HEIGHT: 66 IN

## 2019-12-24 DIAGNOSIS — Z30.41 USES ORAL CONTRACEPTION: ICD-10-CM

## 2019-12-24 DIAGNOSIS — Z13.6 ENCOUNTER FOR LIPID SCREENING FOR CARDIOVASCULAR DISEASE: ICD-10-CM

## 2019-12-24 DIAGNOSIS — Z13.1 SCREENING FOR DIABETES MELLITUS: ICD-10-CM

## 2019-12-24 DIAGNOSIS — Z13.220 ENCOUNTER FOR LIPID SCREENING FOR CARDIOVASCULAR DISEASE: ICD-10-CM

## 2019-12-24 DIAGNOSIS — Z01.419 ENCOUNTER FOR GYNECOLOGICAL EXAMINATION WITHOUT ABNORMAL FINDING: Primary | ICD-10-CM

## 2019-12-24 DIAGNOSIS — E55.9 VITAMIN D DEFICIENCY: ICD-10-CM

## 2019-12-24 LAB
CHOLEST SERPL-MCNC: 214 MG/DL
GLUCOSE BLD-MCNC: 86 MG/DL (ref 70–99)
HDLC SERPL-MCNC: 46 MG/DL
LDLC SERPL CALC-MCNC: 133 MG/DL
NONHDLC SERPL-MCNC: 168 MG/DL
TRIGL SERPL-MCNC: 176 MG/DL

## 2019-12-24 PROCEDURE — 36415 COLL VENOUS BLD VENIPUNCTURE: CPT | Performed by: OBSTETRICS & GYNECOLOGY

## 2019-12-24 PROCEDURE — 80061 LIPID PANEL: CPT | Performed by: OBSTETRICS & GYNECOLOGY

## 2019-12-24 PROCEDURE — 88175 CYTOPATH C/V AUTO FLUID REDO: CPT | Performed by: OBSTETRICS & GYNECOLOGY

## 2019-12-24 PROCEDURE — 87624 HPV HI-RISK TYP POOLED RSLT: CPT | Performed by: OBSTETRICS & GYNECOLOGY

## 2019-12-24 PROCEDURE — 88141 CYTOPATH C/V INTERPRET: CPT | Performed by: OBSTETRICS & GYNECOLOGY

## 2019-12-24 PROCEDURE — 82947 ASSAY GLUCOSE BLOOD QUANT: CPT | Performed by: OBSTETRICS & GYNECOLOGY

## 2019-12-24 PROCEDURE — 99395 PREV VISIT EST AGE 18-39: CPT | Performed by: OBSTETRICS & GYNECOLOGY

## 2019-12-24 PROCEDURE — 82306 VITAMIN D 25 HYDROXY: CPT | Performed by: OBSTETRICS & GYNECOLOGY

## 2019-12-24 RX ORDER — LEVONORGESTREL/ETHIN.ESTRADIOL 0.1-0.02MG
1 TABLET ORAL DAILY
Qty: 84 TABLET | Refills: 4 | Status: SHIPPED | OUTPATIENT
Start: 2019-12-24 | End: 2020-11-28

## 2019-12-24 ASSESSMENT — ANXIETY QUESTIONNAIRES
5. BEING SO RESTLESS THAT IT IS HARD TO SIT STILL: NOT AT ALL
3. WORRYING TOO MUCH ABOUT DIFFERENT THINGS: SEVERAL DAYS
2. NOT BEING ABLE TO STOP OR CONTROL WORRYING: SEVERAL DAYS
GAD7 TOTAL SCORE: 6
IF YOU CHECKED OFF ANY PROBLEMS ON THIS QUESTIONNAIRE, HOW DIFFICULT HAVE THESE PROBLEMS MADE IT FOR YOU TO DO YOUR WORK, TAKE CARE OF THINGS AT HOME, OR GET ALONG WITH OTHER PEOPLE: SOMEWHAT DIFFICULT
7. FEELING AFRAID AS IF SOMETHING AWFUL MIGHT HAPPEN: SEVERAL DAYS
1. FEELING NERVOUS, ANXIOUS, OR ON EDGE: SEVERAL DAYS
6. BECOMING EASILY ANNOYED OR IRRITABLE: SEVERAL DAYS

## 2019-12-24 ASSESSMENT — PATIENT HEALTH QUESTIONNAIRE - PHQ9
5. POOR APPETITE OR OVEREATING: SEVERAL DAYS
SUM OF ALL RESPONSES TO PHQ QUESTIONS 1-9: 1

## 2019-12-24 ASSESSMENT — MIFFLIN-ST. JEOR: SCORE: 1984.61

## 2019-12-24 NOTE — PATIENT INSTRUCTIONS
Follow up with your primary care provider for your other medical problems.  Continue self breast exam.  Increase physical activity and exercise.  PHQ-9/NICOL-7 scores were discussed.  Will continue working with her therapist on anxiety issues.  Lab and pap smear results will be called to the patient.  Co-testing done today due to AXEL I last year; will repeat in 3yrs if negative.  Will need repeat colposcopy if abnormal.  Usual safety and preventative measures counseling done.  BMI >25  Weight loss encouraged.  Long discussion today regarding long term contraception options.  Discussed salpingectomy or vasectomy as permanent options.  Also discussed safety and convenience profile of IUDs.

## 2019-12-25 ASSESSMENT — ANXIETY QUESTIONNAIRES: GAD7 TOTAL SCORE: 6

## 2019-12-26 LAB — DEPRECATED CALCIDIOL+CALCIFEROL SERPL-MC: 39 UG/L (ref 20–75)

## 2019-12-31 LAB
COPATH REPORT: ABNORMAL
PAP: ABNORMAL

## 2020-01-02 PROBLEM — R87.810 ASCUS WITH POSITIVE HIGH RISK HPV CERVICAL: Status: ACTIVE | Noted: 2017-12-04

## 2020-01-02 PROBLEM — R87.610 ASCUS WITH POSITIVE HIGH RISK HPV CERVICAL: Status: ACTIVE | Noted: 2017-12-04

## 2020-01-18 ENCOUNTER — APPOINTMENT (OUTPATIENT)
Dept: ULTRASOUND IMAGING | Facility: CLINIC | Age: 30
End: 2020-01-18
Attending: NURSE PRACTITIONER
Payer: COMMERCIAL

## 2020-01-18 ENCOUNTER — HOSPITAL ENCOUNTER (EMERGENCY)
Facility: CLINIC | Age: 30
Discharge: HOME | End: 2020-01-18
Attending: NURSE PRACTITIONER | Admitting: NURSE PRACTITIONER
Payer: COMMERCIAL

## 2020-01-18 VITALS
RESPIRATION RATE: 18 BRPM | HEART RATE: 112 BPM | OXYGEN SATURATION: 98 % | TEMPERATURE: 98 F | SYSTOLIC BLOOD PRESSURE: 157 MMHG | DIASTOLIC BLOOD PRESSURE: 84 MMHG

## 2020-01-18 DIAGNOSIS — M79.662 PAIN OF LEFT LOWER LEG: ICD-10-CM

## 2020-01-18 PROCEDURE — 93971 EXTREMITY STUDY: CPT | Mod: LT

## 2020-01-18 PROCEDURE — 99284 EMERGENCY DEPT VISIT MOD MDM: CPT | Mod: 25

## 2020-01-18 ASSESSMENT — ENCOUNTER SYMPTOMS
ARTHRALGIAS: 1
SHORTNESS OF BREATH: 0
FEVER: 0
MYALGIAS: 1

## 2020-01-18 NOTE — ED AVS SNAPSHOT
Emergency Department  [address] [facility] [address]  Phone:  [phone]  Fax:  [phone]                                    Estefania Barrios   MRN: [id number]    Department:   Emergency Department   Date of Visit:  1/18/2020           After Visit Summary Signature Page    I have received my discharge instructions, and my questions have been answered. I have discussed any challenges I see with this plan with the nurse or doctor.    ..........................................................................................................................................  Patient/Patient Representative Signature      ..........................................................................................................................................  Patient Representative Print Name and Relationship to Patient    ..................................................               ................................................  Date                                   Time    ..........................................................................................................................................  Reviewed by Signature/Title    ...................................................              ..............................................  Date                                               Time          22EPIC Rev 08/18

## 2020-01-18 NOTE — ED PROVIDER NOTES
"  History     Chief Complaint:  Leg Pain    The history is provided by the patient.      Estefania Barrios is a 29 year old female with the below risk factors who presents with atraumatic outer left foot pain that radiates in to her leg that began yesterday at noon. She reports it began feeling like a cramp that radiated to the back of her calf. The patient reports she attempted to \"pop\" it which did not work and states it later worsened in pain. She reports the pain has prevented her from walking on the foot. She mentions she does not feel pain when resting but she does with exertion. The patient mentions that she went to Urgent Care who advised the patient to come to the emergency department to rule out DVT. She mentions she has taken Tylenol for the pain. The patient denies any swelling, warmth to the foot, fever, chest pain, or shortness of breath.     Cardiac/PE/DVT Risk Factors:  History of hypertension - no  History of hyperlipidemia - no  History of diabetes - no  History of smoking - no  Personal history of PE/DVT - no  Family history of PE/DVT - no  Family history of heart complications - no  Recent travel - no  Recent surgery - no  Other immobilizations - N/A  Cancer - no      Allergies:  No Known Allergies     Medications:    Flexeril  Difluxin   Aviane  Oral contraceptive     Past Medical History:    Anxiety,   ASCUS  AXEL I  Obesity    Past Surgical History:    Wrist Surgery    Family History:    Mother: Hypertension, diabetes, heart disease  Father: Diabetes    Social History:  The patient was accompanied to the ED by her .  Smoking Status: Former Smoker  Smokeless Tobacco: Never Used  Alcohol Use: Yes  Drug Use: No  PCP: Obed Shaw  Marital Status:        Review of Systems   Constitutional: Negative for fever.   Respiratory: Negative for shortness of breath.    Cardiovascular: Negative for chest pain and leg swelling.   Musculoskeletal: Positive for arthralgias and myalgias.           "   All other systems reviewed and are negative.    Physical Exam     Patient Vitals for the past 24 hrs:   BP Temp Temp src Pulse Heart Rate Resp SpO2   01/18/20 1529 (!) 157/84 98  F (36.7  C) Oral 112 112 18 98 %       Physical Exam  Nursing notes reviewed. Vitals reviewed.  General: Alert. Well kept.  Eyes:  Conjunctiva non-injected, non-icteric.  Neck/Throat: Moist mucous membranes. Normal voice.  Cardiac: Regular rhythm. Normal heart sounds. No lower extremity peripheral edema 2+ DP pulse on the left  Pulmonary: Clear and equal breath sounds bilaterally.   Musculoskeletal: Normal gross range of motion of all 4 extremities. Full range of motion left lower extremity without pain. Normal graves test  Neurological: Alert and oriented x4.   Skin: Warm and dry. Normal appearance of visualized exposed skin without rashes or petechiae.  Psych: Affect normal. Good eye contact.    Emergency Department Course   Imaging:  Radiology findings were communicated with the patient who voiced understanding of the findings.    US Lower Extremity Venous Duplex Left  IMPRESSION: No evidence of deep venous thrombosis in the left lower  extremity.  Reading per radiology      Emergency Department Course:  Nursing notes and vitals reviewed.    1603 I performed an exam of the patient as documented above.      The patient was sent for a US Lower Extremity Venous Duplex Left while in the emergency department, results above.      1601 I returned to check on the patient. I explained the findings to the patient who expressed understanding of the findings.     Findings and plan explained to the Patient and spouse. Patient discharged home with instructions regarding supportive care, medications, and reasons to return. The importance of close follow-up was reviewed.       Impression & Plan    Medical Decision Making:  Estefania Barrios is a 29 year old female who presents to the emergency department today for evaluation of left leg tenderness  and pain concerning for a possible DVT after onset of pain yesterday and referral from Urgent Care today.  Ultrasound showed no evidence of DVT.  The patient is otherwise low risk for blood clots, with above risk factors in HPI.  Vital signs are stable and I see no evidence of cellulitis, myositis, bony or musculoskeletal injury or arterial or vascular insufficiency.  She does note she wore new shoes and walked at the [employer] the day prior to onset of her symptoms which is likely the cause of her symptoms.  I feel the patient is stable for discharge.  The patient was instructed to follow-up with PMD in 3-5 days with ongoing symptoms and to return for symptoms of shortness of breath, chest pain or any other new or worsening symptoms. I did discuss that we recommend repeat ultrasound in 3-5 days with ongoing symptoms to ensure no DVT.     Diagnosis:    ICD-10-CM    1. Pain of left lower leg M79.662        Disposition:   Patient discharged home.    Scribe Disclosure:  IJourdan, am serving as a scribe at 5:17 PM on 1/18/2020 to document services personally performed by Karyn Davenport DNP based on my observations and the provider's statements to me.     Scribe Disclosure:  I, Abi Bryan, am serving as a scribe at 5:28 PM on 1/18/2020 to document services personally performed by Karyn Davenport DNP based on my observations and the provider's statements to me.     EMERGENCY DEPARTMENT       Karyn Davenport CNP  01/18/20 1922

## 2020-01-23 ENCOUNTER — OFFICE VISIT (OUTPATIENT)
Dept: FAMILY MEDICINE | Facility: CLINIC | Age: 30
End: 2020-01-23
Payer: COMMERCIAL

## 2020-01-23 VITALS
WEIGHT: 276 LBS | TEMPERATURE: 97.8 F | SYSTOLIC BLOOD PRESSURE: 124 MMHG | BODY MASS INDEX: 44.55 KG/M2 | HEART RATE: 68 BPM | DIASTOLIC BLOOD PRESSURE: 82 MMHG

## 2020-01-23 DIAGNOSIS — M62.838 MUSCLE SPASM: Primary | ICD-10-CM

## 2020-01-23 LAB
ALBUMIN SERPL-MCNC: 3.7 G/DL (ref 3.4–5)
ALP SERPL-CCNC: 75 U/L (ref 40–150)
ALT SERPL W P-5'-P-CCNC: 26 U/L (ref 0–50)
ANION GAP SERPL CALCULATED.3IONS-SCNC: 8 MMOL/L (ref 3–14)
AST SERPL W P-5'-P-CCNC: 11 U/L (ref 0–45)
BILIRUB SERPL-MCNC: 0.3 MG/DL (ref 0.2–1.3)
BUN SERPL-MCNC: 11 MG/DL (ref 7–30)
CALCIUM SERPL-MCNC: 9 MG/DL (ref 8.5–10.1)
CHLORIDE SERPL-SCNC: 110 MMOL/L (ref 94–109)
CO2 SERPL-SCNC: 24 MMOL/L (ref 20–32)
CREAT SERPL-MCNC: 0.64 MG/DL (ref 0.52–1.04)
GFR SERPL CREATININE-BSD FRML MDRD: >90 ML/MIN/{1.73_M2}
GLUCOSE SERPL-MCNC: 94 MG/DL (ref 70–99)
MAGNESIUM SERPL-MCNC: 2.1 MG/DL (ref 1.6–2.3)
POTASSIUM SERPL-SCNC: 3.9 MMOL/L (ref 3.4–5.3)
PROT SERPL-MCNC: 7.6 G/DL (ref 6.8–8.8)
SODIUM SERPL-SCNC: 142 MMOL/L (ref 133–144)

## 2020-01-23 PROCEDURE — 80053 COMPREHEN METABOLIC PANEL: CPT | Performed by: NURSE PRACTITIONER

## 2020-01-23 PROCEDURE — 36415 COLL VENOUS BLD VENIPUNCTURE: CPT | Performed by: NURSE PRACTITIONER

## 2020-01-23 PROCEDURE — 99214 OFFICE O/P EST MOD 30 MIN: CPT | Performed by: NURSE PRACTITIONER

## 2020-01-23 PROCEDURE — 83735 ASSAY OF MAGNESIUM: CPT | Performed by: NURSE PRACTITIONER

## 2020-01-23 NOTE — PATIENT INSTRUCTIONS
1. I'll call you with lab results.   2. Hydrated, rest, elevated  3. Try low back exercises and alternate ice and heat over lumbosacral region  4. Can try ibuprofen

## 2020-01-23 NOTE — PROGRESS NOTES
"Subjective     Estefania Barrios is a 29 year old female who presents to clinic today for the following health issues:      ED/UC Followup:    Facility:  [facility]   Date of visit: 1/18  Reason for visit: pain of left lower leg   Current Status: foot doesn't hurt anymore, but cramping has migrated to left thigh     HPI: Estefania presents today for ED follow up. On 1/18, she visited urgent care after having struggled with some left lateral foot pain that radiated into her calf. The urgent care provider recommended ultrasonographic study to rule out DVT, so she then presented to the ED. Her lower extremity venous duplex was negative for any thrombus from groin to foot. She was sent home with the instruction to follow up if this persists beyond another 5 days. She states today that her foot pain is resolved, but she is still left with some cramping and mild stabbing pains in her left calf and anterior thigh. No numbness / tingling. No swelling, redness, heat, tenderness. Feels like a \"cramping in her muscles\" and \"stabbing\" pain in anterior thigh. No constitutional symptoms. No PE symptoms.     She does state that she had been walking extensively at the [facility] wearing less-than-ideal shoes the day before her presentation to the ED.     Patient Active Problem List   Diagnosis     Obesity     Morbid obesity (H)     ASCUS with positive high risk HPV cervical     Uses oral contraception     Past Surgical History:   Procedure Laterality Date     WRIST SURGERY  02/2019    broke wrist       Social History     Tobacco Use     Smoking status: Former Smoker     Smokeless tobacco: Never Used     Tobacco comment: quit 2015   Substance Use Topics     Alcohol use: Yes     Family History   Problem Relation Age of Onset     Hypertension Mother      Diabetes Mother      Heart Disease Mother      Diabetes Father         type 2      No Known Problems Sister      No Known Problems Brother      No Known Problems Maternal Grandmother      No Known " Problems Maternal Grandfather      No Known Problems Paternal Grandmother      No Known Problems Other            Reviewed and updated as needed this visit by Provider  Tobacco  Allergies  Meds  Problems  Med Hx  Surg Hx  Fam Hx         Review of Systems   ROS COMP: Constitutional, cardiovascular, pulmonary, musculoskeletal, neuro, skin systems are negative, except as otherwise noted.      Objective    /82   Pulse 68   Temp 97.8  F (36.6  C) (Tympanic)   Wt 125.2 kg (276 lb)   BMI 44.55 kg/m    Body mass index is 44.55 kg/m .  Physical Exam   GENERAL: healthy, alert and no distress  MS: Left lower extremity - No erythema, edema, heat, tenderness; Gait and ROM normal.   NEURO: Normal strength and tone, mentation intact and speech normal  BACK: no CVA tenderness, no paralumbar tenderness    Diagnostic Test Results:  none         Assessment & Plan     Estefania was seen today for hospital f/u.    Diagnoses and all orders for this visit:    Muscle spasm  Comment: Unlikely to represent DVT (based on exam, history, low risk, nature and character of pain / complaint). Offered repeat US, but she declines at this time. I suspect that her issue is musculoskeletal in origin. She may have adjusted her gait in response to a painful foot (related to lengthy walking in unsupportive shoes), which has led to residual muscle spasm / pain in her calf and anterior thigh. Suggest elevation, ice, NSAIDs, and close surveillance. Will check lytes today, as well, given perception of cramping muscles. Discussed reasons to call or return to clinic. Estefania acknowledges and demonstrates understanding of circumstances under which care should be sought urgently or emergently. Follow up as discussed.   -     Comprehensive metabolic panel  -     Magnesium        See Patient Instructions    Return in about 1 week (around 1/30/2020) for persistent or worsening symptoms.    Jovani Alvarez NP  [facility] [place]

## 2020-01-28 NOTE — PROGRESS NOTES
INDICATIONS:                                                    Is a pregnancy test required: No.  Was a consent obtained?  Yes    Today's PHQ-2 Score:   PHQ-2 ( 1999 Pfizer) 5/7/2019   Q1: Little interest or pleasure in doing things 0   Q2: Feeling down, depressed or hopeless 0   PHQ-2 Score 0   Q1: Little interest or pleasure in doing things -   Q2: Feeling down, depressed or hopeless -   PHQ-2 Score -     Today's PHQ-9 Score:    PHQ-9 SCORE 12/24/2019   PHQ-9 Total Score 1         Estefania Barrios, is a 29 year old female, who had a recent ASCUS pap.  HPV Other positive Yes prior history of abnormal pap --had first abnormal pap smear while in [place] in 2018 but did not follow up at that time.  Had ASCUS pap with +other HPV last year with AXEL I on both biopsies. Here today for colposcopy. Discussed indication, risks of infection and bleeding.    Her last pap was   Lab Results   Component Value Date    PAP ASC-US 12/24/2019    .    PROCEDURE:                                                      Larger and longer Graves speculum was necessary due to elongated cervix for optimal visualization.  Cervix is stained with acetic acid and viewed colposcopically. Squamocolumnar junction is visualized in it's entirety. acetowhite lesion(s) noted at 4-9 o'clock . Biopsy done Yes x 2. Endocervical curretage Not Done           POST PROCEDURE:                                                      IMPRESSION: Patient tolerated procedure well, colposcopy adequate.    PLAN : Await the results of the biopsies.  She tolerated the procedure well. There were no complications. Patient was discharged in stable condition.    Patient advised to call the clinic if excessive bleeding, pelvic pain, or fever.     Follow-up based on pathology results     Patient Instructions   Pap smear and HPV results reviewed.  Colposcopy discussed in detail including biopsies, restrictions, etc.   All questions answered.  I will call Estefania with biopsy results  later this week.  .  Lashonda Anderson MD

## 2020-01-29 ENCOUNTER — OFFICE VISIT (OUTPATIENT)
Dept: OBGYN | Facility: CLINIC | Age: 30
End: 2020-01-29
Payer: COMMERCIAL

## 2020-01-29 VITALS
DIASTOLIC BLOOD PRESSURE: 74 MMHG | WEIGHT: 279 LBS | HEIGHT: 66 IN | BODY MASS INDEX: 44.84 KG/M2 | HEART RATE: 66 BPM | SYSTOLIC BLOOD PRESSURE: 116 MMHG

## 2020-01-29 DIAGNOSIS — Z01.812 PRE-PROCEDURE LAB EXAM: ICD-10-CM

## 2020-01-29 DIAGNOSIS — Z87.410 PERSONAL HISTORY OF CERVICAL DYSPLASIA: ICD-10-CM

## 2020-01-29 DIAGNOSIS — R87.810 ASCUS WITH POSITIVE HIGH RISK HPV CERVICAL: Primary | ICD-10-CM

## 2020-01-29 DIAGNOSIS — R87.610 ASCUS WITH POSITIVE HIGH RISK HPV CERVICAL: Primary | ICD-10-CM

## 2020-01-29 PROCEDURE — 88305 TISSUE EXAM BY PATHOLOGIST: CPT | Performed by: OBSTETRICS & GYNECOLOGY

## 2020-01-29 PROCEDURE — 88342 IMHCHEM/IMCYTCHM 1ST ANTB: CPT | Performed by: OBSTETRICS & GYNECOLOGY

## 2020-01-29 PROCEDURE — 57455 BIOPSY OF CERVIX W/SCOPE: CPT | Performed by: OBSTETRICS & GYNECOLOGY

## 2020-01-29 ASSESSMENT — MIFFLIN-ST. JEOR: SCORE: 2007.29

## 2020-01-29 NOTE — PATIENT INSTRUCTIONS
Pap smear and HPV results reviewed.  Colposcopy discussed in detail including biopsies, restrictions, etc.   All questions answered.  I will call Estefania with biopsy results later this week.

## 2020-02-03 LAB — COPATH REPORT: NORMAL

## 2020-02-04 NOTE — RESULT ENCOUNTER NOTE
Left a message with biopsy results --both biopsies with AXEL I/mild dysplasia.  Reassured that likely to clear on its own but will follow until that happens.   Recommend repeat co-testing in one year.

## 2020-05-01 DIAGNOSIS — M62.838 NECK MUSCLE SPASM: ICD-10-CM

## 2020-05-01 NOTE — TELEPHONE ENCOUNTER
cyclobenzaprine (FLEXERIL) 10 MG tablet       Last Written Prescription Date:  5/7/19  Last Fill Quantity: 30 tablet,   # refills: 0  Last Office Visit: 1/23/20  Future Office visit:       Routing refill request to provider for review/approval because:  Drug not on the [facility], [facility] or [facility] refill protocol or controlled substance

## 2020-05-01 NOTE — TELEPHONE ENCOUNTER
Routing refill request to provider for review/approval because:  See mychart message from patient. Last prescribed in 2019 in May. Gathered more information from patient. See below:    Yes - I intermittently take it for my back and I am currently out. With COVID going on I know it s hard to get in to be seen and what not. Thanks.    Carrie Patino RN, BSN  [facility] [place]

## 2020-05-01 NOTE — TELEPHONE ENCOUNTER
Sent [employer] message to gather more information.     Carrie Patino RN, BSN  [facility] [place]

## 2020-05-04 RX ORDER — CYCLOBENZAPRINE HCL 10 MG
10 TABLET ORAL 3 TIMES DAILY PRN
Qty: 30 TABLET | Refills: 0 | Status: SHIPPED | OUTPATIENT
Start: 2020-05-04 | End: 2020-11-30

## 2020-07-07 ENCOUNTER — VIRTUAL VISIT (OUTPATIENT)
Dept: FAMILY MEDICINE | Facility: CLINIC | Age: 30
End: 2020-07-07
Payer: COMMERCIAL

## 2020-07-07 DIAGNOSIS — M25.50 MULTIPLE JOINT PAIN: ICD-10-CM

## 2020-07-07 DIAGNOSIS — H57.89 EYE INFLAMMATION: ICD-10-CM

## 2020-07-07 DIAGNOSIS — R79.82 ELEVATED C-REACTIVE PROTEIN (CRP): ICD-10-CM

## 2020-07-07 DIAGNOSIS — R76.8 AUTOANTIBODY TITER POSITIVE: ICD-10-CM

## 2020-07-07 DIAGNOSIS — H57.89 EYE INFLAMMATION: Primary | ICD-10-CM

## 2020-07-07 LAB
CRP SERPL-MCNC: 16 MG/L (ref 0–8)
ERYTHROCYTE [SEDIMENTATION RATE] IN BLOOD BY WESTERGREN METHOD: 13 MM/H (ref 0–20)

## 2020-07-07 PROCEDURE — 36415 COLL VENOUS BLD VENIPUNCTURE: CPT | Performed by: NURSE PRACTITIONER

## 2020-07-07 PROCEDURE — 86235 NUCLEAR ANTIGEN ANTIBODY: CPT | Mod: 59 | Performed by: NURSE PRACTITIONER

## 2020-07-07 PROCEDURE — 86235 NUCLEAR ANTIGEN ANTIBODY: CPT | Performed by: NURSE PRACTITIONER

## 2020-07-07 PROCEDURE — 99214 OFFICE O/P EST MOD 30 MIN: CPT | Mod: 95 | Performed by: NURSE PRACTITIONER

## 2020-07-07 PROCEDURE — 86038 ANTINUCLEAR ANTIBODIES: CPT | Performed by: NURSE PRACTITIONER

## 2020-07-07 PROCEDURE — 86200 CCP ANTIBODY: CPT | Performed by: NURSE PRACTITIONER

## 2020-07-07 PROCEDURE — 85652 RBC SED RATE AUTOMATED: CPT | Performed by: NURSE PRACTITIONER

## 2020-07-07 PROCEDURE — 86140 C-REACTIVE PROTEIN: CPT | Performed by: NURSE PRACTITIONER

## 2020-07-07 RX ORDER — NEOMYCIN POLYMYXIN B SULFATES AND DEXAMETHASONE 3.5; 10000; 1 MG/ML; [USP'U]/ML; MG/ML
SUSPENSION/ DROPS OPHTHALMIC
Qty: 5 ML | Refills: 3 | Status: SHIPPED | OUTPATIENT
Start: 2020-07-07 | End: 2020-11-30

## 2020-07-07 RX ORDER — NEOMYCIN POLYMYXIN B SULFATES AND DEXAMETHASONE 3.5; 10000; 1 MG/ML; [USP'U]/ML; MG/ML
SUSPENSION/ DROPS OPHTHALMIC
Qty: 5 ML | Refills: 3 | Status: SHIPPED | OUTPATIENT
Start: 2020-07-07 | End: 2020-07-07

## 2020-07-07 NOTE — PROGRESS NOTES
"Estefania Barrios is a 29 year old female who is being evaluated via a billable video visit.      The patient has been notified of following:     \"This video visit will be conducted via a call between you and your physician/provider. We have found that certain health care needs can be provided without the need for an in-person physical exam.  This service lets us provide the care you need with a video conversation.  If a prescription is necessary we can send it directly to your pharmacy.  If lab work is needed we can place an order for that and you can then stop by our lab to have the test done at a later time.    Video visits are billed at different rates depending on your insurance coverage.  Please reach out to your insurance provider with any questions.    If during the course of the call the physician/provider feels a video visit is not appropriate, you will not be charged for this service.\"    Patient has given verbal consent for Video visit? Yes  How would you like to obtain your AVS? Belén  Patient would like the video invitation sent by: belén   Will anyone else be joining your video visit? No    Subjective     Estefania Barrios is a 29 year old female who presents today via video visit for the following health issues:    HPI  Eye(s) Problem  Onset: 2018 - ongoing issue, has seen ophthalmologist     Description:   Location: right  Pain: YES  Redness: YES    Accompanying Signs & Symptoms:  Discharge/mattering: no  Swelling: no  Visual changes: no  Fever: no  Nasal Congestion: no  Bothered by bright lights: no    History:   Trauma: no   Foreign body exposure: no    Precipitating factors:   Wearing contacts: no    Alleviating factors:  Improved by: none     Therapies Tried and outcome: Systane drops.        Video Start Time: 7:26 am    HPI: Estefania presents today with the complaint of red, inflamed, painful right eye. This is not a new issue for her. She struggled with this intermittently before moving to [place] " two years ago with no definite diagnosis elucidated. She presented here 2 years ago with similar symptoms in her left eye and was examined by ophthalmology with no clear etiology determined. Diffuse, mild inflammation was noted on exam, so she used a taper of Maxitrol that seemed to clear things up. She has had no recurrence, thankfully, since that time. She does recall that she was experiencing joint pain concurrently, so a basic rheum lab workup was performed but yielded no remarkable findings. Curiously, she also notes joint pain associated with this current episode. Of note, her ocular symptoms are no present in her right eye (left eye is not involved). No vision change.     Patient Active Problem List   Diagnosis     Obesity     Morbid obesity (H)     ASCUS with positive high risk HPV cervical     Uses oral contraception     Past Surgical History:   Procedure Laterality Date     WRIST SURGERY  02/2019    broke wrist       Social History     Tobacco Use     Smoking status: Former Smoker     Smokeless tobacco: Never Used     Tobacco comment: quit 2015   Substance Use Topics     Alcohol use: Yes     Family History   Problem Relation Age of Onset     Hypertension Mother      Diabetes Mother      Heart Disease Mother      Diabetes Father         type 2      No Known Problems Sister      No Known Problems Brother      No Known Problems Maternal Grandmother      No Known Problems Maternal Grandfather      No Known Problems Paternal Grandmother      No Known Problems Other            Reviewed and updated as needed this visit by Provider  Tobacco  Allergies  Meds  Problems  Med Hx  Surg Hx  Fam Hx         Review of Systems   Constitutional, HEENT, cardiovascular, pulmonary, GI, , musculoskeletal, neuro, skin, endocrine and psych systems are negative, except as otherwise noted.      Objective             Physical Exam     GENERAL: Healthy, alert and no distress  EYES: Left eye grossly normal to inspection. Exam  capability limited, but some gross evidence of inflammation (redness, swelling) noted in right eye.   RESP: No audible wheeze, cough, or visible cyanosis.  No visible retractions or increased work of breathing.    SKIN: Visible skin clear. No significant rash, abnormal pigmentation or lesions.  NEURO: Cranial nerves grossly intact.  Mentation and speech appropriate for age.  PSYCH: Mentation appears normal, affect normal/bright, judgement and insight intact, normal speech and appearance well-groomed.      Diagnostic Test Results:  Labs reviewed in Epic        Assessment & Plan     Estefania was seen today for eye problem.    Diagnoses and all orders for this visit:    Eye inflammation  Comment: Reassuringly, she states that these symptoms are identical to those she has suffered with in the past (though in the contralateral eye). Her last episode did respond to combination antibiotic-steroid drops (one month taper), so I will prescribe that same formulation today. She elects to hold off on seeing ophthalmology at this time but will go if this therapy is not effective or if things worsen in the meantime. This seems reasonable. I will check some inflammatory and rheumatologic labs today (including SSA Ro and SSB La antibodies typically seen in those with Sjogren's Syndrome). I will follow up with her after I see these results.   -     SSA Ro HEAVENLY Antibody IgG; Future  -     SSB La HEAVENLY Antibody IgG; Future  -     ESR: Erythrocyte sedimentation rate; Future  -     CRP, inflammation; Future  -     Anti Nuclear Samaria IgG by IFA with Reflex; Future  -     Cyclic Citrullinated Peptide Antibody IgG; Future  -     neomycin-polymixin-dexamethasone (MAXITROL) 0.1 % ophthalmic suspension; To right eye: 1 drop 4 times a day for 1 week, 1 drop 3 times a day for 1 week, 1 drop twice a day for 1 week, 1 drop once a day for a week, and 1 drop every other day for a week    Multiple joint pain  Comment: Will check rheum panel today given  concomitant joint pain. Will follow up and order rheumatology referral if any of these flag.   -     ESR: Erythrocyte sedimentation rate; Future  -     CRP, inflammation; Future  -     Anti Nuclear Samaria IgG by IFA with Reflex; Future  -     Cyclic Citrullinated Peptide Antibody IgG; Future        See Patient Instructions    Return in about 2 weeks (around 7/21/2020) for persistent or worsening symptoms.    Jovani Alvarez NP  [facility]      Video-Visit Details    Type of service:  Video Visit    Video End Time:7:53 AM    Originating Location (pt. Location): Home    Distant Location (provider location):  [facility]     Platform used for Video Visit: AmWell    Return in about 2 weeks (around 7/21/2020) for persistent or worsening symptoms.       Jovani Alvarez NP

## 2020-07-07 NOTE — LETTER
[address] [address]  Phone: [phone]    July 7, 2020        Estefania Barrios  [address] [address] [facility] [place] [address]          To whom it may concern:    RE: Estefania Barrios    Patient was seen and treated today at our clinic and missed work. Please excuse her absence today (7/7) and tomorrow (7/8) for the reason of illness.    Please contact me for questions or concerns.      Sincerely,        Jovani Alvarez NP

## 2020-07-08 ENCOUNTER — MYC MEDICAL ADVICE (OUTPATIENT)
Dept: FAMILY MEDICINE | Facility: CLINIC | Age: 30
End: 2020-07-08

## 2020-07-08 DIAGNOSIS — M25.50 MULTIPLE JOINT PAIN: ICD-10-CM

## 2020-07-08 DIAGNOSIS — H57.89 EYE INFLAMMATION: Primary | ICD-10-CM

## 2020-07-08 DIAGNOSIS — R79.82 CRP ELEVATED: ICD-10-CM

## 2020-07-08 LAB
CCP AB SER IA-ACNC: 2 U/ML
ENA SS-A IGG SER IA-ACNC: 1 AI (ref 0–0.9)
ENA SS-B IGG SER IA-ACNC: <0.2 AI (ref 0–0.9)

## 2020-07-08 RX ORDER — PREDNISONE 20 MG/1
TABLET ORAL
Qty: 13 TABLET | Refills: 0 | Status: SHIPPED | OUTPATIENT
Start: 2020-07-08 | End: 2020-11-30

## 2020-07-08 NOTE — TELEPHONE ENCOUNTER
Please see [employer] message and advise. Thank you very much.    Carrie Patino RN, BSN  [facility] [place]

## 2020-07-09 LAB — ANA SER QL IF: NEGATIVE

## 2020-08-05 ENCOUNTER — TRANSFERRED RECORDS (OUTPATIENT)
Dept: HEALTH INFORMATION MANAGEMENT | Facility: CLINIC | Age: 30
End: 2020-08-05

## 2020-08-05 LAB
ALT SERPL-CCNC: 30 IU/L (ref 5–35)
AST SERPL-CCNC: 18 U/L (ref 5–34)
CREAT SERPL-MCNC: 0.63 MG/DL (ref 0.5–1.3)

## 2020-08-19 ENCOUNTER — TRANSFERRED RECORDS (OUTPATIENT)
Dept: HEALTH INFORMATION MANAGEMENT | Facility: CLINIC | Age: 30
End: 2020-08-19

## 2020-10-22 ENCOUNTER — OFFICE VISIT (OUTPATIENT)
Dept: PODIATRY | Facility: CLINIC | Age: 30
End: 2020-10-22
Payer: COMMERCIAL

## 2020-10-22 VITALS
BODY MASS INDEX: 43.39 KG/M2 | SYSTOLIC BLOOD PRESSURE: 132 MMHG | HEIGHT: 66 IN | DIASTOLIC BLOOD PRESSURE: 78 MMHG | WEIGHT: 270 LBS

## 2020-10-22 DIAGNOSIS — M72.2 PLANTAR FASCIITIS: Primary | ICD-10-CM

## 2020-10-22 PROCEDURE — 99203 OFFICE O/P NEW LOW 30 MIN: CPT | Performed by: PODIATRIST

## 2020-10-22 ASSESSMENT — MIFFLIN-ST. JEOR: SCORE: 1961.46

## 2020-10-22 NOTE — LETTER
10/22/2020         RE: Estefania Barrios  [address] [address] [facility] [address]        Dear Colleague,    Thank you for referring your patient, Estefania Barrios, to the [facility]. Please see a copy of my visit note below.    PATIENT HISTORY:  Estefania Barrios is a 30 year old female who presents to clinic for b/l plantar heel pain, worse after rest.  R more than L.  1 month duration. No injury.  5-9/10 pain.  She has tried massage, foot roller, with some benefit.      Review of Systems:  Patient denies fever, chills, rash, wound, stiffness, limping, numbness, weakness, heart burn, blood in stool, chest pain with activity, calf pain when walking, shortness of breath with activity, chronic cough, easy bleeding/bruising, swelling of ankles, excessive thirst, fatigue, depression, anxiety.     PAST MEDICAL HISTORY:   Past Medical History:   Diagnosis Date     Anxiety      ASCUS with positive high risk HPV cervical 2017     AXEL I (cervical intraepithelial neoplasia I) 12/2019     Obesity      Uses oral contraception         PAST SURGICAL HISTORY:   Past Surgical History:   Procedure Laterality Date     WRIST SURGERY  02/2019    broke wrist        MEDICATIONS:   Current Outpatient Medications:      levonorgestrel-ethinyl estradiol (AVIANE/ALESSE/LESSINA) 0.1-20 MG-MCG tablet, Take 1 tablet by mouth daily Continuously, Disp: 84 tablet, Rfl: 4     cyclobenzaprine (FLEXERIL) 10 MG tablet, Take 1 tablet (10 mg) by mouth 3 times daily as needed for muscle spasms (Patient not taking: Reported on 10/22/2020), Disp: 30 tablet, Rfl: 0     fluconazole (DIFLUCAN) 150 MG tablet, Take 2 tablets (300 mg) by mouth once a week (Patient not taking: Reported on 7/7/2020), Disp: 4 tablet, Rfl: 0     ketoconazole (NIZORAL) 2 % external shampoo, Wet affected area once a week starting early spring, apply shampoo and lather, let sit for 3-5 minutes and then rinse., Disp: 120 mL, Rfl: 11     neomycin-polymixin-dexamethasone  (MAXITROL) 0.1 % ophthalmic suspension, To right eye: 1 drop 4 times a day for 1 week, 1 drop 3 times a day for 1 week, 1 drop twice a day for 1 week, 1 drop once a day for a week, and 1 drop every other day for a week, Disp: 5 mL, Rfl: 3     predniSONE (DELTASONE) 20 MG tablet, Take 3 tabs by mouth daily x 2 days, then 2 tabs daily x 2 days, then 1 tab daily x 2 days, then 1/2 tab daily x 2 days., Disp: 13 tablet, Rfl: 0     ALLERGIES:  No Known Allergies     SOCIAL HISTORY:   Social History     Socioeconomic History     Marital status:      Spouse name: Not on file     Number of children: 0     Years of education: Not on file     Highest education level: Not on file   Occupational History     Occupation: RN     Comment: [employer] --[employer]   Social Needs     Financial resource strain: Not on file     Food insecurity     Worry: Not on file     Inability: Not on file     Transportation needs     Medical: Not on file     Non-medical: Not on file   Tobacco Use     Smoking status: Former Smoker     Smokeless tobacco: Never Used     Tobacco comment: quit 2015   Substance and Sexual Activity     Alcohol use: Yes     Drug use: No     Sexual activity: Yes     Partners: Male     Birth control/protection: Condom, Pill   Lifestyle     Physical activity     Days per week: Not on file     Minutes per session: Not on file     Stress: Not on file   Relationships     Social connections     Talks on phone: Not on file     Gets together: Not on file     Attends [employer] service: Not on file     Active member of club or organization: Not on file     Attends meetings of clubs or organizations: Not on file     Relationship status: Not on file     Intimate partner violence     Fear of current or ex partner: Not on file     Emotionally abused: Not on file     Physically abused: Not on file     Forced sexual activity: Not on file   Other Topics Concern     Parent/sibling w/ CABG, MI or angioplasty before 65F  "55M? Not Asked   Social History Narrative     Not on file        FAMILY HISTORY:   Family History   Problem Relation Age of Onset     Hypertension Mother      Diabetes Mother      Heart Disease Mother      Diabetes Father         type 2      No Known Problems Sister      No Known Problems Brother      No Known Problems Maternal Grandmother      No Known Problems Maternal Grandfather      No Known Problems Paternal Grandmother      No Known Problems Other         EXAM:Vitals: /78   Ht 1.676 m (5' 6\")   Wt 122.5 kg (270 lb)   BMI 43.58 kg/m    BMI= Body mass index is 43.58 kg/m .    General appearance: Patient is alert and fully cooperative with history & exam.  No sign of distress is noted during the visit.     Psychiatric: Affect is pleasant & appropriate.  Patient appears motivated to improve health.     Respiratory: Breathing is regular & unlabored while sitting.     HEENT: Hearing is intact to spoken word.  Speech is clear.  No gross evidence of visual impairment that would impact ambulation.     Dermatologic: Skin is intact to both feet without significant lesions, rash or abrasion.  No paronychia or evidence of soft tissue infection is noted.     Vascular: DP & PT pulses are intact & regular bilaterally.  No significant edema or varicosities noted.  CFT and skin temperature are normal to both lower extremities.     Neurologic: Lower extremity sensation is intact to light touch.  No evidence of weakness or contracture in the lower extremities.  No evidence of neuropathy.     Musculoskeletal: b/l plantar heel pain with palpation at fascia insertions.  No pain with side to side heel squeeze b/l.  Patient is ambulatory without assistive device or brace.  No gross ankle deformity noted.  No foot or ankle joint effusion is noted.     ASSESSMENT: b/l plantar fasciitis     PLAN:  Reviewed patient's chart in epic.  Discussed condition and treatment options including pros and cons.    The potential causes and " nature of plantar fasciitis were discussed with the patient.  We reviewed the natural history/prognosis of the condition and risks if left untreated.        We discussed possible causes of the condition as it relates to the patients specific situation.      Conservative treatment options were reviewed:  appropriate shoes, avoidance of barefoot walking, inserts/orthoses, stretching, ice, massage, immobilization and NSAIDs.     We also reviewed the options of injection therapy and surgery.  However, it was made clear that surgery is only considered when conservative therapy fails.      After thorough discussion and answering all questions, the patient elected to try icing, stretching, superfeet inserts.  F/u 1-2 months prn.     Jovani Holden DPM, FACFAS    Weight management plan: Patient was referred to their PCP to discuss a diet and exercise plan.        Again, thank you for allowing me to participate in the care of your patient.        Sincerely,        Jovani Holden DPM

## 2020-10-22 NOTE — PATIENT INSTRUCTIONS
Thank you for choosing [facility] Podiatry / Foot & Ankle Surgery!    DR. IRWIN'S CLINIC LOCATIONS:     [place] SET UP SURGERY: [phone]   [address]   APPOINTMENTS: [phone]   [place] [address] BILLING Q's: [phone]  [phone] TRIAGE RN:  [phone]       [place]    [address]    [place] [address]    [phone]  [phone]        Follow up: As needed  Diagnosis: Plantar fasciitis  Next steps: Please follow instructions and follow up as needed    Please read through the following handouts and if you have any questions, please feel free to call us or send a [employer] message!    PLANTAR FASCIITIS  Plantar fasciitis is often referred to as heel spurs or heel pain. Plantar fasciitis is a very common problem that affects people of all foot shapes, age, weight and activity level. Pain may be in the arch or on the weight-bearing surface of the heel. The pain may come on without injury or identifiable cause. Pain is generally present when first getting out of bed in the morning or up from a seated break.     CAUSES  The plantar fascia is a dense fibrous band of tissue that stretches across the bottom surface of the foot. The fascia helps support the foot muscles and arch. Plantar fasciitis is thought to be caused by mechanical strain or overload. Frequent walking without shoes or wearing unsupportive shoes is thought to cause structural overload and ultimately inflammation of the plantar fascia. Some people have heel spurs that can be seen on x-ray. The heel spur is actually a minor component of plantar fascitis and is largely ignored.       SELF TREATMENT   The easiest solution is to stop walking around your home without shoes. Plantar fasciitis is largely a shoe problem. Shoes are either not being worn often enough or your current shoes are inadequate for your weight, foot structure or activity level. The majority of shoes on the market  today are not sufficient to resist development of plantar fasciitis or to promote healing. Assume that your current shoes are inadequate and will need to be replaced. Even high quality shoes wear out with 6 months to one year of frequent use. Weight loss is another option. Losing ten pounds in the next two months may be enough to resolve the problem. Ice applied to the area of pain two to three times per day for ten minutes each session can be very helpful. Warm foot soaks in epsom salts can also relieve pain. This should continue until the problem resolves. Achilles tendon stretching is essential. Stretch multiple times daily to promote healing and to prevent recurrence in the future. Over all stretching of the body is helpful as well such as the calves, thighs and lower back. Normally when one area of the body is tight, other areas are too. Gentle Yoga can be good for this.     Over the counter topical anti inflammatories can be helpful such as biofreeze, bengay, salon pas, ect...  Oral ibuprofen or aleve is recommended as well to try to calm down inflammation.     Night splints can be helpful to gradually stretch the foot at night as a lot of pain is when you get up in the morning. Taking a towel or thera band and stretching the foot back multiple times before you get ou of bed can be beneficial as well.     MEDICAL TREATMENT  Medical treatments often include custom arch supports, cortisone injections, physical therapy, splints to be worn in bed, prescription medications and surgery. The home treatments listed above will be necessary regardless of these advanced medical treatments. Surgery is rarely needed but is very helpful in selected cases.     PROGNOSIS  Plantar fasciitis can last from one day to a lifetime. Some people get intermittent fascitis that is very short-lived. Others suffer daily for years. Excessive body weight, frequent bare foot walking, long hours on the feet, inadequate shoes, predisposing  foot structures and excessive activity such as running are all potential issues that lead to chronic and/or recurring plantar fascitis. Having plantar fasciitis means that you are forever prone to this problem and will require modification of some of the above factors. Most people seek treatment within one to four months. Healing usually requires a similar one to four month time frame. Healing time is relative to the amount of effort spent treating the problem.   Plantar fasciitis is highly recurrent. Risk factors often continue, including return to bare foot walking, inadequate shoes, excessive body weight, excessive activities, etc. Your life style and foot structure may predispose you to recurrent plantar fasciitis. A daily prevention regimen can be very helpful. Ongoing use of shoe inserts, careful attention to appropriate shoes, daily Achilles stretching, etc. may prevent recurrence. Prompt attention at the earliest warning signs of heel pain can resolve the problem in as short as a few days.     EXERCISES  Stair Exercise: Step on the stairs with the ball of your foot and hold your position for at least 15 seconds, then slowly step down with the heels of your foot. You can do this daily and as often as you want.   Picking the Towel: Sit comfortably and then pick the towel up with your toes. You can use any object other than a towel as long as the material can be soft and you can pick it up with your toes.  Rolling the Bottle: Use a small ball or frozen water bottle and then roll it around with your foot.   Flex the Toes: Sit comfortably and then flex your toes by pointing it towards the floor or towards your body. This will relax and flex your foot and exercise your plantar fascia, the calf, and the Achilles tendon. The inability of the foot to stretch often causes the bunching up of the plantar fascia area leading to the pain.  Calf/Achilles Stretching: Lay on you back and raise one foot, then point your toes  towards the floor. See photo below:               Hold each stretch for 10 seconds. Stretch 10 times per set, three sets per day. Morning, afternoon and evening. If your heel pain is very severe in the morning, consider doing the first set of stretches before you get out of bed.      OVER THE COUNTER INSERT RECOMMENDATIONS  SuperFeet   Sofsole Fit Spenco   Power Step   Walk-Fit Arch Cradles     Most of these can be found at your local [employer], [employer], or online.  **A good high quality over the counter insert should cost around $40-$50      [employer] LOCATIONS  [place]  [address] [place] [address]  [phone]   [address]  [phone] [place]  [address] [place] [address]  [phone]   [place]  [address] [place] [address] [place]  [phone]   [place]  [address] [facility]  [phone] [place] [address]  [phone]   [place]  [address]  [phone]   [place]  [address]  [phone] [place]  [address]  [phone]               Estefania to follow up with Primary Care provider regarding elevated blood pressure. (if equal or above 140/90)

## 2020-10-22 NOTE — PROGRESS NOTES
PATIENT HISTORY:  Estefania Barrios is a 30 year old female who presents to clinic for b/l plantar heel pain, worse after rest.  R more than L.  1 month duration. No injury.  5-9/10 pain.  She has tried massage, foot roller, with some benefit.      Review of Systems:  Patient denies fever, chills, rash, wound, stiffness, limping, numbness, weakness, heart burn, blood in stool, chest pain with activity, calf pain when walking, shortness of breath with activity, chronic cough, easy bleeding/bruising, swelling of ankles, excessive thirst, fatigue, depression, anxiety.     PAST MEDICAL HISTORY:   Past Medical History:   Diagnosis Date     Anxiety      ASCUS with positive high risk HPV cervical 2017     AXEL I (cervical intraepithelial neoplasia I) 12/2019     Obesity      Uses oral contraception         PAST SURGICAL HISTORY:   Past Surgical History:   Procedure Laterality Date     WRIST SURGERY  02/2019    broke wrist        MEDICATIONS:   Current Outpatient Medications:      levonorgestrel-ethinyl estradiol (AVIANE/ALESSE/LESSINA) 0.1-20 MG-MCG tablet, Take 1 tablet by mouth daily Continuously, Disp: 84 tablet, Rfl: 4     cyclobenzaprine (FLEXERIL) 10 MG tablet, Take 1 tablet (10 mg) by mouth 3 times daily as needed for muscle spasms (Patient not taking: Reported on 10/22/2020), Disp: 30 tablet, Rfl: 0     fluconazole (DIFLUCAN) 150 MG tablet, Take 2 tablets (300 mg) by mouth once a week (Patient not taking: Reported on 7/7/2020), Disp: 4 tablet, Rfl: 0     ketoconazole (NIZORAL) 2 % external shampoo, Wet affected area once a week starting early spring, apply shampoo and lather, let sit for 3-5 minutes and then rinse., Disp: 120 mL, Rfl: 11     neomycin-polymixin-dexamethasone (MAXITROL) 0.1 % ophthalmic suspension, To right eye: 1 drop 4 times a day for 1 week, 1 drop 3 times a day for 1 week, 1 drop twice a day for 1 week, 1 drop once a day for a week, and 1 drop every other day for a week, Disp: 5 mL, Rfl: 3      predniSONE (DELTASONE) 20 MG tablet, Take 3 tabs by mouth daily x 2 days, then 2 tabs daily x 2 days, then 1 tab daily x 2 days, then 1/2 tab daily x 2 days., Disp: 13 tablet, Rfl: 0     ALLERGIES:  No Known Allergies     SOCIAL HISTORY:   Social History     Socioeconomic History     Marital status:      Spouse name: Not on file     Number of children: 0     Years of education: Not on file     Highest education level: Not on file   Occupational History     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   Social Needs     Financial resource strain: Not on file     Food insecurity     Worry: Not on file     Inability: Not on file     Transportation needs     Medical: Not on file     Non-medical: Not on file   Tobacco Use     Smoking status: Former Smoker     Smokeless tobacco: Never Used     Tobacco comment: quit 2015   Substance and Sexual Activity     Alcohol use: Yes     Drug use: No     Sexual activity: Yes     Partners: Male     Birth control/protection: Condom, Pill   Lifestyle     Physical activity     Days per week: Not on file     Minutes per session: Not on file     Stress: Not on file   Relationships     Social connections     Talks on phone: Not on file     Gets together: Not on file     Attends Worship service: Not on file     Active member of club or organization: Not on file     Attends meetings of clubs or organizations: Not on file     Relationship status: Not on file     Intimate partner violence     Fear of current or ex partner: Not on file     Emotionally abused: Not on file     Physically abused: Not on file     Forced sexual activity: Not on file   Other Topics Concern     Parent/sibling w/ CABG, MI or angioplasty before 65F 55M? Not Asked   Social History Narrative     Not on file        FAMILY HISTORY:   Family History   Problem Relation Age of Onset     Hypertension Mother      Diabetes Mother      Heart Disease Mother      Diabetes Father         type 2      No  "Known Problems Sister      No Known Problems Brother      No Known Problems Maternal Grandmother      No Known Problems Maternal Grandfather      No Known Problems Paternal Grandmother      No Known Problems Other         EXAM:Vitals: /78   Ht 1.676 m (5' 6\")   Wt 122.5 kg (270 lb)   BMI 43.58 kg/m    BMI= Body mass index is 43.58 kg/m .    General appearance: Patient is alert and fully cooperative with history & exam.  No sign of distress is noted during the visit.     Psychiatric: Affect is pleasant & appropriate.  Patient appears motivated to improve health.     Respiratory: Breathing is regular & unlabored while sitting.     HEENT: Hearing is intact to spoken word.  Speech is clear.  No gross evidence of visual impairment that would impact ambulation.     Dermatologic: Skin is intact to both feet without significant lesions, rash or abrasion.  No paronychia or evidence of soft tissue infection is noted.     Vascular: DP & PT pulses are intact & regular bilaterally.  No significant edema or varicosities noted.  CFT and skin temperature are normal to both lower extremities.     Neurologic: Lower extremity sensation is intact to light touch.  No evidence of weakness or contracture in the lower extremities.  No evidence of neuropathy.     Musculoskeletal: b/l plantar heel pain with palpation at fascia insertions.  No pain with side to side heel squeeze b/l.  Patient is ambulatory without assistive device or brace.  No gross ankle deformity noted.  No foot or ankle joint effusion is noted.     ASSESSMENT: b/l plantar fasciitis     PLAN:  Reviewed patient's chart in epic.  Discussed condition and treatment options including pros and cons.    The potential causes and nature of plantar fasciitis were discussed with the patient.  We reviewed the natural history/prognosis of the condition and risks if left untreated.        We discussed possible causes of the condition as it relates to the patients specific " situation.      Conservative treatment options were reviewed:  appropriate shoes, avoidance of barefoot walking, inserts/orthoses, stretching, ice, massage, immobilization and NSAIDs.     We also reviewed the options of injection therapy and surgery.  However, it was made clear that surgery is only considered when conservative therapy fails.      After thorough discussion and answering all questions, the patient elected to try icing, stretching, superfeet inserts.  F/u 1-2 months prn.     Jovani Holden DPM, FACFAS    Weight management plan: Patient was referred to their PCP to discuss a diet and exercise plan.

## 2020-10-29 ENCOUNTER — RESULTS ONLY (OUTPATIENT)
Dept: LAB | Age: 30
End: 2020-10-29

## 2020-10-29 ENCOUNTER — OFFICE VISIT (OUTPATIENT)
Dept: URGENT CARE | Facility: URGENT CARE | Age: 30
End: 2020-10-29
Payer: COMMERCIAL

## 2020-10-29 DIAGNOSIS — Z53.9 ERRONEOUS ENCOUNTER--DISREGARD: Primary | ICD-10-CM

## 2020-10-29 LAB
SARS-COV-2 RNA SPEC QL NAA+PROBE: NORMAL
SPECIMEN SOURCE: NORMAL

## 2020-10-29 PROCEDURE — 10000001 PR ERRONEOUS ENCOUNTER--DISREGARD

## 2020-10-30 LAB
LABORATORY COMMENT REPORT: NORMAL
SARS-COV-2 RNA SPEC QL NAA+PROBE: NEGATIVE
SPECIMEN SOURCE: NORMAL

## 2020-11-28 ENCOUNTER — MYC REFILL (OUTPATIENT)
Dept: OBGYN | Facility: CLINIC | Age: 30
End: 2020-11-28

## 2020-11-28 DIAGNOSIS — Z30.41 USES ORAL CONTRACEPTION: ICD-10-CM

## 2020-11-30 RX ORDER — LEVONORGESTREL/ETHIN.ESTRADIOL 0.1-0.02MG
1 TABLET ORAL DAILY
Qty: 84 TABLET | Refills: 0 | Status: SHIPPED | OUTPATIENT
Start: 2020-11-30 | End: 2020-12-18

## 2020-11-30 NOTE — TELEPHONE ENCOUNTER
"Requested Prescriptions   Pending Prescriptions Disp Refills     levonorgestrel-ethinyl estradiol (AVIANE) 0.1-20 MG-MCG tablet 84 tablet 4     Sig: Take 1 tablet by mouth daily Continuously       Contraceptives Protocol Passed - 11/28/2020  7:56 PM        Passed - Patient is not a current smoker if age is 35 or older        Passed - Recent (12 mo) or future (30 days) visit within the authorizing provider's specialty     Patient has had an office visit with the authorizing provider or a provider within the authorizing providers department within the previous 12 mos or has a future within next 30 days. See \"Patient Info\" tab in inbasket, or \"Choose Columns\" in Meds & Orders section of the refill encounter.              Passed - Medication is active on med list        Passed - No active pregnancy on record        Passed - No positive pregnancy test in past 12 months           Last Written Prescription Date:  12/24/19  Last Fill Quantity: 84,  # refills: 4   Last office visit: 1/29/2020 with prescribing provider:  Dr Anderson   Future Office Visit:   Next 5 appointments (look out 90 days)    Dec 23, 2020  9:20 AM  PHYSICAL with Lashonda Anderson MD  [facility] ([facility]) [address] [place] [id number] [phone]         Prescription approved per [facility] Refill Protocol.  Pamela Pollack RN on 11/30/2020 at 12:18 PM          "

## 2020-12-17 DIAGNOSIS — Z11.59 SCREENING FOR VIRAL DISEASE: ICD-10-CM

## 2020-12-17 PROCEDURE — 36415 COLL VENOUS BLD VENIPUNCTURE: CPT | Performed by: FAMILY MEDICINE

## 2020-12-17 PROCEDURE — 86769 SARS-COV-2 COVID-19 ANTIBODY: CPT | Performed by: FAMILY MEDICINE

## 2020-12-18 DIAGNOSIS — Z30.41 USES ORAL CONTRACEPTION: ICD-10-CM

## 2020-12-18 LAB
COVID-19 SPIKE RBD ABY TITER: NORMAL
COVID-19 SPIKE RBD ABY: NEGATIVE

## 2020-12-18 RX ORDER — LEVONORGESTREL/ETHIN.ESTRADIOL 0.1-0.02MG
1 TABLET ORAL DAILY
Qty: 28 TABLET | Refills: 0 | Status: SHIPPED | OUTPATIENT
Start: 2020-12-18 | End: 2020-12-23

## 2020-12-18 NOTE — TELEPHONE ENCOUNTER
Requested Prescriptions   Pending Prescriptions Disp Refills     levonorgestrel-ethinyl estradiol (AVIANE) 0.1-20 MG-MCG tablet 28 tablet 0     Sig: Take 1 tablet by mouth daily Continuously       There is no refill protocol information for this order        Last Written Prescription Date:  11/30/20  Last Fill Quantity: 84,  # refills: 0   Last office visit: 1/29/2020 with prescribing provider:  Dr Anderson   Future Office Visit:   Next 5 appointments (look out 90 days)    Dec 23, 2020  9:20 AM  PHYSICAL with Lashonda Anderson MD  [facility] ([facility]) [address] [place] [id number]  [phone]         Prescription approved per [facility] Refill Protocol.  Christine Valadez RN on 12/18/2020 at 9:24 AM

## 2020-12-22 NOTE — PROGRESS NOTES
Estefania is a 30 year old  female who presents for annual exam.     Besides routine health maintenance, she has no other health concerns today .    HPI:  Here today for yearly exam --doing well.  Essentially amenorrheic with continuous OCPs.  Had one day of bleeding randomly last month but no issues prior or since.  +SA --denies issues/concerns.  No bowel or bladder problems.  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 --difficult but loves her work  -not currently exercising due to busyness at work --has been working 120hrs/2wks and simply rests/recoups on days off; does have peloton but not using  +SBE -no issues; had a few days of nipple sensitivity a few weeks ago but now resolved; no discharge, masses, etc  PCP --NP Antonio --follows bloodwork, meds, etc  Also seeing psych NP for anxiety and sleep issues --on paxil and prazosin and doing well --seeing her again next week  -saw rheumatologist this year for joint issues; follow up labs were normal and she will follow up only as needed  -has already had flu and covid #1 vaccine this year!  -hx AXEL I in 2018 and 2019 --due for repeat co-testing this year      GYNECOLOGIC HISTORY:    No LMP recorded. (Menstrual status: Birth Control).    Regular menses? no  Her current contraception method is: oral contraceptives.  She  reports that she has quit smoking. She has never used smokeless tobacco.    Patient is sexually active.  STD testing offered?  Declined  Last PHQ-9 score on record =   PHQ-9 SCORE 2020   PHQ-9 Total Score 4     Last GAD7 score on record =   NICOL-7 SCORE 2020   Total Score 7     Alcohol Score = 1    HEALTH MAINTENANCE:  Cholesterol:   Cholesterol   Date Value Ref Range Status   2019 214 (H) <200 mg/dL Final     Comment:     Desirable:       <200 mg/dl     ]Last Mammo: Not applicable, Result: Not  "applicable, Next Mammo: Due at age 40  Pap:   Lab Results   Component Value Date    PAP ASC-US 2019    PAP ASC-US 2018 ASCUS HPV other (+)pos  Colonoscopy:  NA, Result: Not applicable, Next Colonoscopy: NA years.  Dexa:  NA    Health maintenance updated:  yes    HISTORY:  OB History    Para Term  AB Living   0 0 0 0 0 0   SAB TAB Ectopic Multiple Live Births   0 0 0 0 0       Patient Active Problem List   Diagnosis     Obesity     Morbid obesity (H)     Personal history of cervical dysplasia     Uses oral contraception     Past Surgical History:   Procedure Laterality Date     WRIST SURGERY  2019    broke wrist      Social History     Tobacco Use     Smoking status: Former Smoker     Smokeless tobacco: Never Used     Tobacco comment: quit    Substance Use Topics     Alcohol use: Yes      Problem (# of Occurrences) Relation (Name,Age of Onset)    Diabetes (2) Mother, Father: type 2     Heart Disease (1) Mother    Hypertension (1) Mother    No Known Problems (6) Sister, Brother, Maternal Grandmother, Maternal Grandfather, Paternal Grandmother, Other            Current Outpatient Medications   Medication Sig     cholecalciferol (VITAMIN D3) 125 mcg (5000 units) capsule      levonorgestrel-ethinyl estradiol (AVIANE) 0.1-20 MG-MCG tablet Take 1 tablet by mouth daily Continuously     PARoxetine (PAXIL) 10 MG tablet      prazosin (MINIPRESS) 1 MG capsule TK 1 C PO HS     No current facility-administered medications for this visit.      No Known Allergies    Past medical, surgical, social and family histories were reviewed and updated in EPIC.    ROS:   12 point review of systems negative other than symptoms noted below or in the HPI.  No urinary frequency or dysuria, bladder or kidney problems    EXAM:  /86   Ht 1.689 m (5' 6.5\")   Wt 129.3 kg (285 lb)   Breastfeeding No   BMI 45.31 kg/m     BMI: Body mass index is 45.31 kg/m .    PHYSICAL EXAM:  Constitutional: "   Appearance: Well nourished, well developed, alert, in no acute distress  Neck:  Lymph Nodes:  No lymphadenopathy present    Thyroid:  Gland size normal, nontender, no nodules or masses present  on palpation  Chest:  Respiratory Effort:  Breathing unlabored  Cardiovascular:    Heart: Auscultation:  Regular rate, normal rhythm, no murmurs present  Breasts: Inspection of Breasts:  No lymphadenopathy present., Palpation of Breasts and Axillae:  No masses present on palpation, no breast tenderness., Axillary Lymph Nodes:  No lymphadenopathy present. and No nodularity, asymmetry or nipple discharge bilaterally.  Gastrointestinal:   Abdominal Examination:  Abdomen nontender to palpation, tone normal without rigidity or guarding, no masses present, umbilicus without lesions   Liver and Spleen:  No hepatomegaly present, liver nontender to palpation    Hernias:  No hernias present  Lymphatic: Lymph Nodes:  No other lymphadenopathy present  Skin:  General Inspection:  No rashes present, no lesions present, no areas of  discoloration  Neurologic:    Mental Status:  Oriented X3.  Normal strength and tone, sensory exam                grossly normal, mentation intact and speech normal.    Psychiatric:   Mentation appears normal and affect normal/bright.         Pelvic Exam:  External Genitalia:     Normal appearance for age, no discharge present, no tenderness present, no inflammatory lesions present, color normal  Vagina:     Normal vaginal vault without central or paravaginal defects, no discharge present, no inflammatory lesions present, no masses present  Bladder:     Nontender to palpation  Urethra:   Urethral Body:  Urethra palpation normal, urethra structural support normal   Urethral Meatus:  No erythema or lesions present  Cervix:     Appearance healthy, no lesions present, nontender to palpation, no bleeding present  Uterus:     Uterus: firm, normal sized and nontender, anteverted in position.   Adnexa:     No adnexal  tenderness present, no adnexal masses present  Perineum:     Perineum within normal limits, no evidence of trauma, no rashes or skin lesions present  Anus:     Anus within normal limits, no hemorrhoids present  Inguinal Lymph Nodes:     No lymphadenopathy present  Pubic Hair:     Normal pubic hair distribution for age  Genitalia and Groin:     No rashes present, no lesions present, no areas of discoloration, no masses present      COUNSELING:   Reviewed preventive health counseling, as reflected in patient instructions  Special attention given to:        Regular exercise       Healthy diet/nutrition       Contraception    BMI: Body mass index is 45.31 kg/m .  Weight management plan: Discussed healthy diet and exercise guidelines Patient was referred to their PCP to discuss a diet and exercise plan.    ASSESSMENT:  30 year old female with satisfactory annual exam.    ICD-10-CM    1. Encounter for gynecological examination without abnormal finding  Z01.419 Pap imaged thin layer screen with HPV - recommended age 30 - 65     HPV High Risk Types DNA Cervical   2. Uses oral contraception  Z30.41 levonorgestrel-ethinyl estradiol (AVIANE) 0.1-20 MG-MCG tablet   3. BMI 45.0-49.9, adult (H)  Z68.42        PLAN:  Patient Instructions   Follow up with your primary care provider/psychiatrist for your other medical problems.  Continue self breast exam.  Increase physical activity and exercise.  Lab and pap smear results will be called to the patient.  Co-testing repeated today due to hx of AXEL I in 2018 and 2019.  Usual safety and preventative measures counseling done.  BMI >25  Weight loss encouraged      Lashonda Anderson MD

## 2020-12-23 ENCOUNTER — OFFICE VISIT (OUTPATIENT)
Dept: OBGYN | Facility: CLINIC | Age: 30
End: 2020-12-23
Payer: COMMERCIAL

## 2020-12-23 VITALS
HEIGHT: 67 IN | SYSTOLIC BLOOD PRESSURE: 132 MMHG | DIASTOLIC BLOOD PRESSURE: 86 MMHG | BODY MASS INDEX: 44.73 KG/M2 | WEIGHT: 285 LBS

## 2020-12-23 DIAGNOSIS — Z01.419 ENCOUNTER FOR GYNECOLOGICAL EXAMINATION WITHOUT ABNORMAL FINDING: Primary | ICD-10-CM

## 2020-12-23 DIAGNOSIS — Z30.41 USES ORAL CONTRACEPTION: ICD-10-CM

## 2020-12-23 PROCEDURE — 88175 CYTOPATH C/V AUTO FLUID REDO: CPT | Performed by: OBSTETRICS & GYNECOLOGY

## 2020-12-23 PROCEDURE — 99395 PREV VISIT EST AGE 18-39: CPT | Performed by: OBSTETRICS & GYNECOLOGY

## 2020-12-23 PROCEDURE — 87624 HPV HI-RISK TYP POOLED RSLT: CPT | Performed by: OBSTETRICS & GYNECOLOGY

## 2020-12-23 RX ORDER — PRAZOSIN HYDROCHLORIDE 1 MG/1
CAPSULE ORAL
COMMUNITY
Start: 2020-09-17 | End: 2021-03-09 | Stop reason: ALTCHOICE

## 2020-12-23 RX ORDER — PAROXETINE 10 MG/1
TABLET, FILM COATED ORAL
COMMUNITY
Start: 2020-08-30 | End: 2021-03-09 | Stop reason: ALTCHOICE

## 2020-12-23 RX ORDER — LEVONORGESTREL/ETHIN.ESTRADIOL 0.1-0.02MG
1 TABLET ORAL DAILY
Qty: 84 TABLET | Refills: 4 | Status: SHIPPED | OUTPATIENT
Start: 2020-12-23 | End: 2021-12-27

## 2020-12-23 ASSESSMENT — ANXIETY QUESTIONNAIRES
IF YOU CHECKED OFF ANY PROBLEMS ON THIS QUESTIONNAIRE, HOW DIFFICULT HAVE THESE PROBLEMS MADE IT FOR YOU TO DO YOUR WORK, TAKE CARE OF THINGS AT HOME, OR GET ALONG WITH OTHER PEOPLE: SOMEWHAT DIFFICULT
3. WORRYING TOO MUCH ABOUT DIFFERENT THINGS: SEVERAL DAYS
7. FEELING AFRAID AS IF SOMETHING AWFUL MIGHT HAPPEN: SEVERAL DAYS
6. BECOMING EASILY ANNOYED OR IRRITABLE: SEVERAL DAYS
GAD7 TOTAL SCORE: 7
2. NOT BEING ABLE TO STOP OR CONTROL WORRYING: SEVERAL DAYS
1. FEELING NERVOUS, ANXIOUS, OR ON EDGE: MORE THAN HALF THE DAYS
5. BEING SO RESTLESS THAT IT IS HARD TO SIT STILL: NOT AT ALL

## 2020-12-23 ASSESSMENT — MIFFLIN-ST. JEOR: SCORE: 2037.44

## 2020-12-23 ASSESSMENT — PATIENT HEALTH QUESTIONNAIRE - PHQ9
SUM OF ALL RESPONSES TO PHQ QUESTIONS 1-9: 4
5. POOR APPETITE OR OVEREATING: SEVERAL DAYS

## 2020-12-23 NOTE — PATIENT INSTRUCTIONS
Follow up with your primary care provider/psychiatrist for your other medical problems.  Continue self breast exam.  Increase physical activity and exercise.  Lab and pap smear results will be called to the patient.  Co-testing repeated today due to hx of AXEL I in 2018 and 2019.  Usual safety and preventative measures counseling done.  BMI >25  Weight loss encouraged

## 2020-12-24 ASSESSMENT — ANXIETY QUESTIONNAIRES: GAD7 TOTAL SCORE: 7

## 2020-12-29 LAB
COPATH REPORT: NORMAL
PAP: NORMAL

## 2020-12-31 LAB
FINAL DIAGNOSIS: NORMAL
HPV HR 12 DNA CVX QL NAA+PROBE: NEGATIVE
HPV16 DNA SPEC QL NAA+PROBE: NEGATIVE
HPV18 DNA SPEC QL NAA+PROBE: NEGATIVE
SPECIMEN DESCRIPTION: NORMAL
SPECIMEN SOURCE CVX/VAG CYTO: NORMAL

## 2021-01-03 ENCOUNTER — HEALTH MAINTENANCE LETTER (OUTPATIENT)
Age: 31
End: 2021-01-03

## 2021-01-04 PROBLEM — Z87.410 PERSONAL HISTORY OF CERVICAL DYSPLASIA: Status: ACTIVE | Noted: 2017-12-04

## 2021-02-04 ENCOUNTER — HOSPITAL ENCOUNTER (EMERGENCY)
Facility: CLINIC | Age: 31
Discharge: HOME | End: 2021-02-05
Attending: EMERGENCY MEDICINE | Admitting: EMERGENCY MEDICINE
Payer: COMMERCIAL

## 2021-02-04 DIAGNOSIS — R55 NEAR SYNCOPE: ICD-10-CM

## 2021-02-04 DIAGNOSIS — R00.2 PALPITATIONS: ICD-10-CM

## 2021-02-04 PROCEDURE — 93005 ELECTROCARDIOGRAM TRACING: CPT

## 2021-02-04 PROCEDURE — 99285 EMERGENCY DEPT VISIT HI MDM: CPT

## 2021-02-04 ASSESSMENT — ENCOUNTER SYMPTOMS
PALPITATIONS: 1
LIGHT-HEADEDNESS: 1
BACK PAIN: 1
DIZZINESS: 1

## 2021-02-04 ASSESSMENT — MIFFLIN-ST. JEOR: SCORE: 1993.21

## 2021-02-04 NOTE — Clinical Note
Estefania Barrios was seen and treated in our emergency department on 2/4/2021.  She may return to work on 02/08/2021.       If you have any questions or concerns, please don't hesitate to call.      Ana Atreaga MD

## 2021-02-05 ENCOUNTER — APPOINTMENT (OUTPATIENT)
Dept: GENERAL RADIOLOGY | Facility: CLINIC | Age: 31
End: 2021-02-05
Attending: EMERGENCY MEDICINE
Payer: COMMERCIAL

## 2021-02-05 VITALS
RESPIRATION RATE: 22 BRPM | SYSTOLIC BLOOD PRESSURE: 142 MMHG | OXYGEN SATURATION: 99 % | WEIGHT: 277 LBS | TEMPERATURE: 97.6 F | HEART RATE: 96 BPM | HEIGHT: 66 IN | DIASTOLIC BLOOD PRESSURE: 76 MMHG | BODY MASS INDEX: 44.52 KG/M2

## 2021-02-05 LAB
ALBUMIN SERPL-MCNC: 3.9 G/DL (ref 3.4–5)
ALBUMIN UR-MCNC: NEGATIVE MG/DL
ALP SERPL-CCNC: 69 U/L (ref 40–150)
ALT SERPL W P-5'-P-CCNC: 34 U/L (ref 0–50)
ANION GAP SERPL CALCULATED.3IONS-SCNC: 4 MMOL/L (ref 3–14)
APPEARANCE UR: CLEAR
AST SERPL W P-5'-P-CCNC: 13 U/L (ref 0–45)
BASOPHILS # BLD AUTO: 0.1 10E9/L (ref 0–0.2)
BASOPHILS NFR BLD AUTO: 0.6 %
BILIRUB SERPL-MCNC: 0.3 MG/DL (ref 0.2–1.3)
BILIRUB UR QL STRIP: NEGATIVE
BUN SERPL-MCNC: 12 MG/DL (ref 7–30)
CALCIUM SERPL-MCNC: 9.2 MG/DL (ref 8.5–10.1)
CHLORIDE SERPL-SCNC: 109 MMOL/L (ref 94–109)
CO2 SERPL-SCNC: 26 MMOL/L (ref 20–32)
COLOR UR AUTO: YELLOW
CREAT SERPL-MCNC: 0.8 MG/DL (ref 0.52–1.04)
D DIMER PPP FEU-MCNC: 0.3 UG/ML FEU (ref 0–0.5)
DIFFERENTIAL METHOD BLD: ABNORMAL
EOSINOPHIL # BLD AUTO: 0.2 10E9/L (ref 0–0.7)
EOSINOPHIL NFR BLD AUTO: 2 %
ERYTHROCYTE [DISTWIDTH] IN BLOOD BY AUTOMATED COUNT: 13.2 % (ref 10–15)
GFR SERPL CREATININE-BSD FRML MDRD: >90 ML/MIN/{1.73_M2}
GLUCOSE SERPL-MCNC: 95 MG/DL (ref 70–99)
GLUCOSE UR STRIP-MCNC: NEGATIVE MG/DL
HCG UR QL: NEGATIVE
HCT VFR BLD AUTO: 41 % (ref 35–47)
HGB BLD-MCNC: 13.2 G/DL (ref 11.7–15.7)
HGB UR QL STRIP: NEGATIVE
IMM GRANULOCYTES # BLD: 0 10E9/L (ref 0–0.4)
IMM GRANULOCYTES NFR BLD: 0.3 %
KETONES UR STRIP-MCNC: NEGATIVE MG/DL
LEUKOCYTE ESTERASE UR QL STRIP: ABNORMAL
LYMPHOCYTES # BLD AUTO: 3.8 10E9/L (ref 0.8–5.3)
LYMPHOCYTES NFR BLD AUTO: 31.9 %
MCH RBC QN AUTO: 28.3 PG (ref 26.5–33)
MCHC RBC AUTO-ENTMCNC: 32.2 G/DL (ref 31.5–36.5)
MCV RBC AUTO: 88 FL (ref 78–100)
MONOCYTES # BLD AUTO: 0.7 10E9/L (ref 0–1.3)
MONOCYTES NFR BLD AUTO: 6.3 %
MUCOUS THREADS #/AREA URNS LPF: PRESENT /LPF
NEUTROPHILS # BLD AUTO: 7 10E9/L (ref 1.6–8.3)
NEUTROPHILS NFR BLD AUTO: 58.9 %
NITRATE UR QL: NEGATIVE
NRBC # BLD AUTO: 0 10*3/UL
NRBC BLD AUTO-RTO: 0 /100
PH UR STRIP: 6 PH (ref 5–7)
PLATELET # BLD AUTO: 404 10E9/L (ref 150–450)
POTASSIUM SERPL-SCNC: 3.8 MMOL/L (ref 3.4–5.3)
PROT SERPL-MCNC: 7.5 G/DL (ref 6.8–8.8)
RBC # BLD AUTO: 4.67 10E12/L (ref 3.8–5.2)
RBC #/AREA URNS AUTO: <1 /HPF (ref 0–2)
SODIUM SERPL-SCNC: 139 MMOL/L (ref 133–144)
SOURCE: ABNORMAL
SP GR UR STRIP: 1.02 (ref 1–1.03)
SQUAMOUS #/AREA URNS AUTO: 5 /HPF (ref 0–1)
TROPONIN I SERPL-MCNC: <0.015 UG/L (ref 0–0.04)
TSH SERPL DL<=0.005 MIU/L-ACNC: 2.71 MU/L (ref 0.4–4)
UROBILINOGEN UR STRIP-MCNC: 0 MG/DL (ref 0–2)
WBC # BLD AUTO: 11.8 10E9/L (ref 4–11)
WBC #/AREA URNS AUTO: 2 /HPF (ref 0–5)

## 2021-02-05 PROCEDURE — 81025 URINE PREGNANCY TEST: CPT | Performed by: EMERGENCY MEDICINE

## 2021-02-05 PROCEDURE — 85379 FIBRIN DEGRADATION QUANT: CPT | Performed by: EMERGENCY MEDICINE

## 2021-02-05 PROCEDURE — 71046 X-RAY EXAM CHEST 2 VIEWS: CPT

## 2021-02-05 PROCEDURE — 96360 HYDRATION IV INFUSION INIT: CPT

## 2021-02-05 PROCEDURE — 84484 ASSAY OF TROPONIN QUANT: CPT | Performed by: EMERGENCY MEDICINE

## 2021-02-05 PROCEDURE — 85025 COMPLETE CBC W/AUTO DIFF WBC: CPT | Performed by: EMERGENCY MEDICINE

## 2021-02-05 PROCEDURE — 84443 ASSAY THYROID STIM HORMONE: CPT | Performed by: EMERGENCY MEDICINE

## 2021-02-05 PROCEDURE — 81001 URINALYSIS AUTO W/SCOPE: CPT | Performed by: EMERGENCY MEDICINE

## 2021-02-05 PROCEDURE — 80053 COMPREHEN METABOLIC PANEL: CPT | Performed by: EMERGENCY MEDICINE

## 2021-02-05 PROCEDURE — 87086 URINE CULTURE/COLONY COUNT: CPT | Performed by: EMERGENCY MEDICINE

## 2021-02-05 PROCEDURE — 258N000003 HC RX IP 258 OP 636: Performed by: EMERGENCY MEDICINE

## 2021-02-05 RX ADMIN — SODIUM CHLORIDE 1000 ML: 9 INJECTION, SOLUTION INTRAVENOUS at 00:22

## 2021-02-05 NOTE — ED PROVIDER NOTES
"  History   Chief Complaint:  Chest Pain      HPI   Estefania Barrios is a 30 year old female with history of anxiety who presents with palpitations and intermittent dizziness for the past 3 days. The patient states that 3 days ago she noticed she was having episodes of fluttering palpitations that she would feel into her throat. She also mentions having some intermittent dizziness, light headedness, chest pain and left upper back pain. Her episodes of symptoms last about a minute at a time, but have become more frequent over the past 2 days. The patient states that she recently flew to [place], but denies any prior DVT or smoking history. She denies any symptoms of COVID-19 and reports that she has been vaccinated.     Review of Systems   Cardiovascular: Positive for chest pain and palpitations.   Musculoskeletal: Positive for back pain.   Neurological: Positive for dizziness and light-headedness.   All other systems reviewed and are negative.        Allergies:  The patient has no known allergies.     Medications:  Aviane  Paxil  Minipress     Past Medical History:    Anxiety  ASCUS  Cervical intraepithelial neoplasia  Obesity    Past Surgical History:    Wrist surgery     Family History:    Mother: hypertension, diabetes, heart disease   Father: type II diabetes    Social History:  The patient presents with her significant other.  She currently works as a nurse at [employer].     Physical Exam     Patient Vitals for the past 24 hrs:   BP Temp Temp src Pulse Resp SpO2 Height Weight   02/05/21 0200 (!) 142/76 -- -- 96 22 99 % -- --   02/05/21 0115 (!) 151/84 -- -- 90 12 100 % -- --   02/05/21 0100 (!) 157/91 -- -- 90 29 100 % -- --   02/05/21 0045 (!) 160/92 -- -- 81 23 100 % -- --   02/05/21 0035 (!) 145/89 -- -- 86 25 100 % -- --   02/04/21 2243 (!) 173/96 97.6  F (36.4  C) Oral 107 16 100 % 1.676 m (5' 6\") 125.6 kg (277 lb)       Physical Exam  General: Patient is alert and normal appearing.  HEENT: Head " atraumatic    Eyes: pupils equal and reactive. Conjunctiva clear   Nares: patent   Oropharynx: no lesions, uvula midline, no palatal draping, normal voice, no trismus  Neck: Supple without lymphadenopathy, no meningismus  Chest: Heart regular rate and rhythm.   Lungs: Equal clear to auscultation with no wheeze or rales  Abdomen: Soft, non tender, nondistended, normal bowel sounds  Back: No costovertebral angle tenderness, no midline C, T or L spine tenderness  Neuro: Grossly nonfocal, normal speech, strength equal bilaterally, CN 2-12 intact  Extremities: No deformities, equal radial and DP pulses. No clubbing, cyanosis.  No edema  Skin: Warm and dry with no rash.       Emergency Department Course   ECG:  Indication: palpitations  Completed at 2245.  Read at 2300.   Rate 100 bpm. SD interval 134. QRS duration 74. QT/QTc 334/430. P-R-T axes 47 53 46.  Normal sinus rhythm.    Imaging:  XR Chest 2 Views  Negative chest.  Reading per radiology.    Laboratory:  CBC: WBC 11.8 (H) o/w WNL. (HGB 13.2, )   CMP: AWNL (Creatinine 0.80)    Troponin (Collected 0021): <0.015  D Dimer (Collected 0021): 0.3    TSH with free T4 reflex: 2.71  HCG Qualitative Urine: Negative     UA with micro: Leukocyte Esterase moderate, Squamous Epithelial 5 (H), Mucous present o/w negative     Emergency Department Course:    Reviewed:  I reviewed nursing notes, vitals, past medical history and care everywhere    Assessments:  2304 I obtained history and examined the patient as noted above.   0154 I rechecked the patient and explained findings.     Interventions:  0022 NS, 1 L, IV bolus      Disposition:  The patient was discharged to home.       Impression & Plan     Medical Decision Making:  Estefania Barrios is a 30 year old female who presents for evaluation of palpitations associated with dizziness and near syncope when it occurs.  Initial ECG shows sinus rhythm with no acute ischemic changes.  A broad differential diagnosis was  considered including SVT, Atrial fibrillation, ventricular arrhythmia, thyroid disease, acute electrolyte abnormality,  drugs/medications, caffeine intake or other stimulants, medication side effect, anemia, heart disease, PE, etc.  The workup and exam here in ED would indicate that supportive outpatient management is indicated.  I doubt PE as patient has no significant risk factors and D-dimer is within normal limits.  Doubt acute coronary syndrome given symptoms and exam.  Will have them follow up with theology and Zio patch was ordered for placement tomorrow.  Patient was monitored for several hours with an occasional PVC identified but no runs of PVCs or prolonged dysrhythmia.  Patient has some mild hypertension but doubt that is contributing.  Recommend close outpatient follow-up.  If further symptoms develop recommend return to the emergency department.        Diagnosis:    ICD-10-CM    1. Palpitations  R00.2 Follow-Up with Cardiologist     Leadless EKG Monitor 3 to 14 Days   2. Near syncope  R55        Discharge Medications:  None    Scribe Disclosure:  I, Jay Romero, am serving as a scribe at 10:57 PM on 2/4/2021 to document services personally performed by Ana Arteaga MD based on my observations and the provider's statements to me.            Ana Arteaga MD  02/05/21 032

## 2021-02-06 LAB
BACTERIA SPEC CULT: NORMAL
Lab: NORMAL
SPECIMEN SOURCE: NORMAL

## 2021-02-09 ENCOUNTER — HOSPITAL ENCOUNTER (OUTPATIENT)
Dept: CARDIOLOGY | Facility: CLINIC | Age: 31
Discharge: HOME | End: 2021-02-09
Attending: EMERGENCY MEDICINE | Admitting: EMERGENCY MEDICINE
Payer: COMMERCIAL

## 2021-02-09 DIAGNOSIS — R00.2 PALPITATIONS: ICD-10-CM

## 2021-02-09 PROCEDURE — 93242 EXT ECG>48HR<7D RECORDING: CPT

## 2021-02-09 PROCEDURE — 93244 EXT ECG>48HR<7D REV&INTERPJ: CPT | Performed by: INTERNAL MEDICINE

## 2021-03-05 NOTE — PROGRESS NOTES
HPI and Plan:   Today I had the pleasure of seeing Estefania Barrios at [facility]. She is a pleasant 30 year old patient with no past cardiac medical history presents to the clinic in an initial consultation for palpitations.  The patient presented to the emergency department with complaints of palpitations and associated lightheadedness.  Work-up was negative.  She then underwent rhythm monitor on which she was noted to have very symptomatic PVCs even though the burden was less than 1%.  No significant arrhythmias were noted.  The patient was on 2 antidepressants at the time and her symptoms resolved after stopping those 2 medications.  She is supposed to see her psychiatrist tomorrow for further management of depression.  She reports to having tried propranolol in the past for work-related anxiety and it made her feel really tired.    Assessment and plan  1.  Symptomatic PVCs- resolved after stopping antidepressants  2.  Hyperlipidemia  3.  BMI  45    Monitor for now. Will reassess in 6 months and if s/s dont recur will thereafter follow on as needed basis. Discussed performing an echo and Holter if s/s recurs.     Thank you for allowing me to participate in the care of Estefania Barrios    This note was completed in part using Dragon voice recognition software. Although reviewed after completion, some word and grammatical errors may occur.    Marlon Kelley MD  Cardiology    Orders Placed This Encounter   Procedures     Follow-Up with Cardiologist     EKG 12-lead complete w/read - Clinics (performed today)       No orders of the defined types were placed in this encounter.      Medications Discontinued During This Encounter   Medication Reason     PARoxetine (PAXIL) 10 MG tablet Alternate therapy     prazosin (MINIPRESS) 1 MG capsule Alternate therapy       Encounter Diagnosis   Name Primary?     Palpitations        CURRENT MEDICATIONS:  Current Outpatient Medications   Medication Sig  Dispense Refill     cholecalciferol (VITAMIN D3) 125 mcg (5000 units) capsule        levonorgestrel-ethinyl estradiol (AVIANE) 0.1-20 MG-MCG tablet Take 1 tablet by mouth daily Continuously 84 tablet 4       ALLERGIES   No Known Allergies    PAST MEDICAL HISTORY:  Past Medical History:   Diagnosis Date     Anxiety      ASCUS with positive high risk HPV cervical 2017     AXEL I (cervical intraepithelial neoplasia I) 12/2019 2018, 2019     Obesity      Uses oral contraception        PAST SURGICAL HISTORY:  Past Surgical History:   Procedure Laterality Date     WRIST SURGERY  02/2019    broke wrist       FAMILY HISTORY:  Family History   Problem Relation Age of Onset     Hypertension Mother      Diabetes Mother      Heart Disease Mother      Diabetes Father         type 2      No Known Problems Sister      No Known Problems Brother      No Known Problems Maternal Grandmother      No Known Problems Maternal Grandfather      No Known Problems Paternal Grandmother      No Known Problems Other        SOCIAL HISTORY:  Social History     Socioeconomic History     Marital status:      Spouse name: None     Number of children: 0     Years of education: None     Highest education level: None   Occupational History     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   Social Needs     Financial resource strain: None     Food insecurity     Worry: None     Inability: None     Transportation needs     Medical: None     Non-medical: None   Tobacco Use     Smoking status: Former Smoker     Smokeless tobacco: Never Used     Tobacco comment: quit 2015   Substance and Sexual Activity     Alcohol use: Yes     Comment: occ.     Drug use: No     Sexual activity: Yes     Partners: Male     Birth control/protection: Pill   Lifestyle     Physical activity     Days per week: None     Minutes per session: None     Stress: None   Relationships      "Social connections     Talks on phone: None     Gets together: None     Attends Church service: None     Active member of club or organization: None     Attends meetings of clubs or organizations: None     Relationship status: None     Intimate partner violence     Fear of current or ex partner: None     Emotionally abused: None     Physically abused: None     Forced sexual activity: None   Other Topics Concern     Parent/sibling w/ CABG, MI or angioplasty before 65F 55M? Not Asked   Social History Narrative     None       Review of Systems:  Skin:  Negative       Eyes:  Negative      ENT:  Negative      Respiratory:  Negative       Cardiovascular:  Negative      Gastroenterology: Negative      Genitourinary:  Negative      Musculoskeletal:  Negative      Neurologic:  Negative      Psychiatric:  Positive for anxiety    Heme/Lymph/Imm:  Negative      Endocrine:  Negative        Physical Exam:  Vitals: /84   Pulse 99   Ht 1.676 m (5' 6\")   Wt 126.5 kg (278 lb 14.4 oz)   BMI 45.02 kg/m    Constitutional: awake, alert, no distress  Head: Normocephalic, atraumatic  Eyes: Conjunctivae and lids unremarkable, sclera white  ENT: No pallor or cyanosis  Respiratory: Good chest movement, no distress  Cardiac: Regular rate and rhythm, S1-S2 normal. No murmurs gallops or rubs. No pedal edema.   Extremities and musculoskeletal: No gross motor deficit  Neurological.  Affect normal  Psych: Alert and oriented x3    Recent Lab Results:  LIPID RESULTS:  Lab Results   Component Value Date    CHOL 214 (H) 12/24/2019    HDL 46 (L) 12/24/2019     (H) 12/24/2019    TRIG 176 (H) 12/24/2019       LIVER ENZYME RESULTS:  Lab Results   Component Value Date    AST 13 02/05/2021    ALT 34 02/05/2021       CBC RESULTS:  Lab Results   Component Value Date    WBC 11.8 (H) 02/05/2021    RBC 4.67 02/05/2021    HGB 13.2 02/05/2021    HCT 41.0 02/05/2021    MCV 88 02/05/2021    MCH 28.3 02/05/2021    MCHC 32.2 02/05/2021    RDW 13.2 " 02/05/2021     02/05/2021       BMP RESULTS:  Lab Results   Component Value Date     02/05/2021    POTASSIUM 3.8 02/05/2021    CHLORIDE 109 02/05/2021    CO2 26 02/05/2021    ANIONGAP 4 02/05/2021    GLC 95 02/05/2021    BUN 12 02/05/2021    CR 0.80 02/05/2021    GFRESTIMATED >90 02/05/2021    GFRESTBLACK >90 02/05/2021    SCOTT 9.2 02/05/2021        A1C RESULTS:  No results found for: A1C    INR RESULTS:  No results found for: INR    CC  Ana Arteaga MD  [facility]  [address]  [place] [address]    All medical records were reviewed in detail and discussed with the patient. Greater than 20 mins were spent with the patient, 50% of this time was spent on counseling and coordination of care.  After visit summary was printed and given to the patient.

## 2021-03-09 ENCOUNTER — OFFICE VISIT (OUTPATIENT)
Dept: CARDIOLOGY | Facility: CLINIC | Age: 31
End: 2021-03-09
Attending: EMERGENCY MEDICINE
Payer: COMMERCIAL

## 2021-03-09 VITALS
DIASTOLIC BLOOD PRESSURE: 84 MMHG | HEIGHT: 66 IN | BODY MASS INDEX: 44.82 KG/M2 | HEART RATE: 99 BPM | WEIGHT: 278.9 LBS | SYSTOLIC BLOOD PRESSURE: 137 MMHG

## 2021-03-09 DIAGNOSIS — R00.2 PALPITATIONS: ICD-10-CM

## 2021-03-09 PROCEDURE — 93000 ELECTROCARDIOGRAM COMPLETE: CPT | Performed by: INTERNAL MEDICINE

## 2021-03-09 PROCEDURE — 99203 OFFICE O/P NEW LOW 30 MIN: CPT | Performed by: INTERNAL MEDICINE

## 2021-03-09 ASSESSMENT — MIFFLIN-ST. JEOR: SCORE: 2001.83

## 2021-03-09 NOTE — LETTER
3/9/2021    Jovani Alvarez, NP  [address] [place] [address]    RE: Estefania Barrios       Dear Colleague,    I had the pleasure of seeing Estefania Barrios in the [facility].    HPI and Plan:   Today I had the pleasure of seeing Estefania Barrios at [facility]. She is a pleasant 30 year old patient with no past cardiac medical history presents to the clinic in an initial consultation for palpitations.  The patient presented to the emergency department with complaints of palpitations and associated lightheadedness.  Work-up was negative.  She then underwent rhythm monitor on which she was noted to have very symptomatic PVCs even though the burden was less than 1%.  No significant arrhythmias were noted.  The patient was on 2 antidepressants at the time and her symptoms resolved after stopping those 2 medications.  She is supposed to see her psychiatrist tomorrow for further management of depression.  She reports to having tried propranolol in the past for work-related anxiety and it made her feel really tired.    Assessment and plan  1.  Symptomatic PVCs- resolved after stopping antidepressants  2.  Hyperlipidemia  3.  BMI  45    Monitor for now. Will reassess in 6 months and if s/s dont recur will thereafter follow on as needed basis. Discussed performing an echo and Holter if s/s recurs.     Thank you for allowing me to participate in the care of Estefania Barrios    This note was completed in part using Dragon voice recognition software. Although reviewed after completion, some word and grammatical errors may occur.    Marlon Kelley MD  Cardiology    Orders Placed This Encounter   Procedures     Follow-Up with Cardiologist     EKG 12-lead complete w/read - Clinics (performed today)       No orders of the defined types were placed in this encounter.      Medications Discontinued During This Encounter    Medication Reason     PARoxetine (PAXIL) 10 MG tablet Alternate therapy     prazosin (MINIPRESS) 1 MG capsule Alternate therapy       Encounter Diagnosis   Name Primary?     Palpitations        CURRENT MEDICATIONS:  Current Outpatient Medications   Medication Sig Dispense Refill     cholecalciferol (VITAMIN D3) 125 mcg (5000 units) capsule        levonorgestrel-ethinyl estradiol (AVIANE) 0.1-20 MG-MCG tablet Take 1 tablet by mouth daily Continuously 84 tablet 4       ALLERGIES   No Known Allergies    PAST MEDICAL HISTORY:  Past Medical History:   Diagnosis Date     Anxiety      ASCUS with positive high risk HPV cervical 2017     AXEL I (cervical intraepithelial neoplasia I) 12/2019 2018, 2019     Obesity      Uses oral contraception        PAST SURGICAL HISTORY:  Past Surgical History:   Procedure Laterality Date     WRIST SURGERY  02/2019    broke wrist       FAMILY HISTORY:  Family History   Problem Relation Age of Onset     Hypertension Mother      Diabetes Mother      Heart Disease Mother      Diabetes Father         type 2      No Known Problems Sister      No Known Problems Brother      No Known Problems Maternal Grandmother      No Known Problems Maternal Grandfather      No Known Problems Paternal Grandmother      No Known Problems Other        SOCIAL HISTORY:  Social History     Socioeconomic History     Marital status:      Spouse name: None     Number of children: 0     Years of education: None     Highest education level: None   Occupational History     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     Drug use: No     Sexual activity: Yes     Partners: Male     Birth control/protection: Pill   Lifestyle     Physical activity     Days per week: None     Minutes per session: None     Stress: None   Relationships     Social connections     Talks on phone: None     Gets together: None     Attends Jain service: None     Active member of club or organization: None     Attends meetings of clubs or organizations: None     Relationship status: None     Intimate partner violence     Fear of current or ex partner: None     Emotionally abused: None     Physically abused: None     Forced sexual activity: None   Other Topics Concern     Parent/sibling w/ CABG, MI or angioplasty before 65F 55M? Not Asked   Social History Narrative     None       Review of Systems:  Skin:  Negative       Eyes:  Negative      ENT:  Negative      Respiratory:  Negative       Cardiovascular:  Negative      Gastroenterology: Negative      Genitourinary:  Negative      Musculoskeletal:  Negative      Neurologic:  Negative      Psychiatric:  Positive for anxiety    Heme/Lymph/Imm:  Negative      Endocrine:  Negative        Physical Exam:  Vitals: /84   Pulse 99   Ht 1.676 m (5' 6\")   Wt 126.5 kg (278 lb 14.4 oz)   BMI 45.02 kg/m    Constitutional: awake, alert, no distress  Head: Normocephalic, atraumatic  Eyes: Conjunctivae and lids unremarkable, sclera white  ENT: No pallor or cyanosis  Respiratory: Good chest movement, no distress  Cardiac: Regular rate and rhythm, S1-S2 normal. No murmurs gallops or rubs. No pedal edema.   Extremities and musculoskeletal: No gross motor deficit  Neurological.  Affect normal  Psych: Alert and oriented x3    Recent Lab Results:  LIPID RESULTS:  Lab Results   Component Value Date    CHOL 214 (H) 12/24/2019    HDL 46 (L) 12/24/2019     (H) 12/24/2019    TRIG 176 (H) 12/24/2019       LIVER ENZYME RESULTS:  Lab Results   Component Value Date    " AST 13 02/05/2021    ALT 34 02/05/2021       CBC RESULTS:  Lab Results   Component Value Date    WBC 11.8 (H) 02/05/2021    RBC 4.67 02/05/2021    HGB 13.2 02/05/2021    HCT 41.0 02/05/2021    MCV 88 02/05/2021    MCH 28.3 02/05/2021    MCHC 32.2 02/05/2021    RDW 13.2 02/05/2021     02/05/2021       BMP RESULTS:  Lab Results   Component Value Date     02/05/2021    POTASSIUM 3.8 02/05/2021    CHLORIDE 109 02/05/2021    CO2 26 02/05/2021    ANIONGAP 4 02/05/2021    GLC 95 02/05/2021    BUN 12 02/05/2021    CR 0.80 02/05/2021    GFRESTIMATED >90 02/05/2021    GFRESTBLACK >90 02/05/2021    SCOTT 9.2 02/05/2021        A1C RESULTS:  No results found for: A1C    INR RESULTS:  No results found for: INR    CC  Ana Arteaga MD  [facility]  [address]  [place] [address]    All medical records were reviewed in detail and discussed with the patient. Greater than 20 mins were spent with the patient, 50% of this time was spent on counseling and coordination of care.  After visit summary was printed and given to the patient     The patient has been re-examined and I agree with the above assessment or I updated with my findings.

## 2021-10-10 ENCOUNTER — HEALTH MAINTENANCE LETTER (OUTPATIENT)
Age: 31
End: 2021-10-10

## 2021-10-19 ENCOUNTER — OFFICE VISIT (OUTPATIENT)
Dept: FAMILY MEDICINE | Facility: CLINIC | Age: 31
End: 2021-10-19
Payer: COMMERCIAL

## 2021-10-19 VITALS
WEIGHT: 276 LBS | TEMPERATURE: 97.9 F | DIASTOLIC BLOOD PRESSURE: 80 MMHG | SYSTOLIC BLOOD PRESSURE: 132 MMHG | OXYGEN SATURATION: 100 % | BODY MASS INDEX: 44.55 KG/M2 | HEART RATE: 100 BPM

## 2021-10-19 DIAGNOSIS — R10.10 RECURRENT UPPER ABDOMINAL PAIN: Primary | ICD-10-CM

## 2021-10-19 LAB
ERYTHROCYTE [DISTWIDTH] IN BLOOD BY AUTOMATED COUNT: 13.3 % (ref 10–15)
HCT VFR BLD AUTO: 41.8 % (ref 35–47)
HGB BLD-MCNC: 13.4 G/DL (ref 11.7–15.7)
LIPASE SERPL-CCNC: 86 U/L (ref 73–393)
MCH RBC QN AUTO: 28.6 PG (ref 26.5–33)
MCHC RBC AUTO-ENTMCNC: 32.1 G/DL (ref 31.5–36.5)
MCV RBC AUTO: 89 FL (ref 78–100)
PLATELET # BLD AUTO: 431 10E3/UL (ref 150–450)
RBC # BLD AUTO: 4.69 10E6/UL (ref 3.8–5.2)
WBC # BLD AUTO: 11.9 10E3/UL (ref 4–11)

## 2021-10-19 PROCEDURE — 99214 OFFICE O/P EST MOD 30 MIN: CPT | Performed by: FAMILY MEDICINE

## 2021-10-19 PROCEDURE — 85027 COMPLETE CBC AUTOMATED: CPT | Performed by: FAMILY MEDICINE

## 2021-10-19 PROCEDURE — 82150 ASSAY OF AMYLASE: CPT | Performed by: FAMILY MEDICINE

## 2021-10-19 PROCEDURE — 36415 COLL VENOUS BLD VENIPUNCTURE: CPT | Performed by: FAMILY MEDICINE

## 2021-10-19 PROCEDURE — 80053 COMPREHEN METABOLIC PANEL: CPT | Performed by: FAMILY MEDICINE

## 2021-10-19 PROCEDURE — 83690 ASSAY OF LIPASE: CPT | Performed by: FAMILY MEDICINE

## 2021-10-19 RX ORDER — BUPROPION HYDROCHLORIDE 100 MG/1
TABLET, EXTENDED RELEASE ORAL
COMMUNITY
Start: 2021-10-17 | End: 2022-03-23

## 2021-10-19 RX ORDER — DEXTROAMPHETAMINE SACCHARATE, AMPHETAMINE ASPARTATE, DEXTROAMPHETAMINE SULFATE AND AMPHETAMINE SULFATE 2.5; 2.5; 2.5; 2.5 MG/1; MG/1; MG/1; MG/1
TABLET ORAL 2 TIMES DAILY
COMMUNITY
Start: 2021-10-14 | End: 2022-01-14 | Stop reason: DRUGHIGH

## 2021-10-19 ASSESSMENT — PAIN SCALES - GENERAL: PAINLEVEL: NO PAIN (0)

## 2021-10-19 NOTE — PATIENT INSTRUCTIONS
Patient Education     Abdominal Pain  Abdominal pain is pain in the stomach or belly area. Everyone has this pain from time to time. In many cases it goes away on its own. But abdominal pain can sometimes be due to a serious problem, such as appendicitis. So it s important to know when to get help.    Causes of abdominal pain  There are many possible causes of abdominal pain. Common causes in adults include:    Constipation, diarrhea, or gas    Stomach acid flowing back up into the esophagus (acid reflux or heartburn)    Severe acid reflux, called GERD (gastroesophageal reflux disease)    A sore in the lining of the stomach or small intestine (peptic ulcer)    Inflammation of the gallbladder, liver, or pancreas    Gallstones or kidney stones    Appendicitis     Intestinal blockage     An internal organ pushing through a muscle or other tissue (hernia)    Urinary tract infections    In women, menstrual cramps, fibroids, ovarian cysts, pelvic inflammatory disease, or endometriosis    Inflammation or infection of the intestines, including Crohn's disease and ulcerative colitis    Irritable bowel syndrome  Diagnosing the cause of abdominal pain  Your healthcare provider will give you a physical exam help find the cause of your pain. If needed, you will have tests. Belly pain has many possible causes. So it can be hard to find the reason for your pain. Giving details about your pain can help. Tell your provider where and when you feel the pain, and what makes it better or worse. Also let your provider know if you have other symptoms such as:    Fever    Tiredness    Upset stomach (nausea)    Vomiting    Changes in bathroom habits    Blood in the stool or black, tarry stool    Weight loss that you can't explain (involuntary weight loss?)  Also report any family history of stomach or intestinal problems, or cancers. Tell your provider about all your alcohol use and drug use. Tell your provider about all medicines you  use, including herbs, vitamins, and supplements.  Treating abdominal pain  Some causes of pain need emergency medical treatment right away. These include appendicitis or a bowel blockage. Other problems can be treated with rest, fluids, or medicines. Your healthcare provider can give you specific instructions for treatment or self-care based on what is causing your pain.     If you have vomiting or diarrhea, sip water or other clear fluids. When you are ready to eat solid foods again, start with small amounts of easy-to-digest, low-fat foods. These include apple sauce, toast, or crackers.  When to get medical care  Call 911 or go to the hospital right away if you:    Can t pass stool and are vomiting    Are vomiting blood or have bloody diarrhea or black, tarry diarrhea    Have chest, neck, or shoulder pain    Feel like you might pass out    Have pain in your shoulder blades with nausea    Have sudden, severe belly pain    Have new, severe pain unlike any you have felt before    Have a belly that is rigid, hard, and hurts to touch  Call your healthcare provider if you have:    Pain for more than 5 days    Bloating for more than 2 days    Diarrhea for more than 5 days    A fever of 100.4 F (38 C) or higher, or as directed by your healthcare provider    Pain that gets worse    Weight loss for no reason    Continued lack of appetite    Blood in your stool  How to prevent abdominal pain  Here are some tips to help prevent abdominal pain:    Eat smaller amounts of food at each meal.    Don't eat greasy, fried, or other high-fat foods.    Don't eat foods that give you gas.    Exercise regularly.    Drink plenty of fluids.  To help prevent GERD symptoms:    Quit smoking.    Reduce alcohol and foods that increase stomach acid.    Don't use aspirin or over-the-counter pain and fever medicines, if possible. This includes nonsteroidal anti-inflammatory drugs (NSAIDs).    Lose excess weight.    Finish eating at least 2 hours  before you go to bed or lie down.    Raise the head of your bed.  [employer] last reviewed this educational content on 4/1/2019 2000-2021 [employer]. All rights reserved. This information is not intended as a substitute for professional medical care. Always follow your healthcare professional's instructions.

## 2021-10-19 NOTE — PROGRESS NOTES
"    Assessment & Plan     (R10.10) Recurrent upper abdominal pain  (primary encounter diagnosis)  Comment: epigastric   Plan: CBC with platelets, Comprehensive metabolic         panel, Amylase, Lipase, US Abdomen Complete          Discussed possible differential diagnosis for her symptoms. Gi related vs also concerns with gall bladder issue   Clinically doing ok at this time. Check labs. Also wish to proceed with ultrasound ,    Cares and symptomatic treatment discussed. May take OTC Prilosec to see if helps with any sx.Talked about warning s/s for which should be seen urgently   She will follow up if problem       Patient expressed understanding and agreement with treatment plan. All patient's questions were answered, will let me know if has more later.  Medications: Rx's: Reviewed the potential side effects/complications of medications prescribed.         BMI:   Estimated body mass index is 44.55 kg/m  as calculated from the following:    Height as of 3/9/21: 1.676 m (5' 6\").    Weight as of this encounter: 125.2 kg (276 lb).   Weight management plan: Discussed healthy diet and exercise guidelines      Lata Carlson MD  [facility] PR[facility] is a 31 year old who presents for the following health issues     HPI     Abdominal  Pain  Onset/Duration: 2 weeks - 3 episodes that have woken pt up at night - gastric pain radiating into back across rib cage , last episode early this morning . Has had couple of episodes, were only for 2 hours but earlier today was bit more intense. Feeling much better now and has no sx   Mostly has episodic sx, happened early mooring and woke her up from sleep \"almost felt like hunger pain at first like she is hungary and then became a bit stronger pain \",  just concerned bc of recurrent sx   Description:   Character: Gnawing  Location:  Starts in epigastric region, but goes around under rib cage and wraps around back   Radiation: " Back  Intensity: moderate  Progression of Symptoms:  Intermittent, sharp pressure but was intense this morning   Accompanying Signs & Symptoms:  Fever/Chills: no  Gas/Bloating: YES, but not significant heart burn . Was mostly worried about gall bladder . Mom had gall bladder removed during pregnancy   Nausea: no  Vomiting: no  Diarrhea: no, had normal bm this afternoon and it was normal   Constipation: YES- intermittently , no new sx otherwise   Dysuria or Hematuria: no  History:   Trauma: no  Previous similar pain: no  Previous tests done: none  Precipitating factors:   Does the pain change with:     Food: no, no correlation to eating.   ate lunch today with no problem . She does not smoke, alcohol very infrequent may be once a month     Bowel Movement: no    Urination: no   Other factors:  no  Therapies tried and outcome: TUMS, did not helps. Does not take it often and she  tried it bc of these sx   No LMP recorded. (Menstrual status: Birth Control). she is on continuous  BCP so not concerned           Review of Systems   Constitutional, HEENT, cardiovascular, pulmonary, GI, , musculoskeletal, neuro, skin, endocrine and psych systems are negative, except as otherwise noted.      Objective    /80   Pulse 100   Temp 97.9  F (36.6  C) (Tympanic)   Wt 125.2 kg (276 lb)   SpO2 100%   BMI 44.55 kg/m    Body mass index is 44.55 kg/m .  Physical Exam   GENERAL: healthy, alert and no distress  HENT: ear canals and TM's normal, nose and mouth without ulcers or lesions  NECK: no adenopathy, no asymmetry, masses, or scars and thyroid normal to palpation  RESP: lungs clear to auscultation - no rales, rhonchi or wheezes  CV: regular rate and rhythm, normal S1 S2, no S3 or S4,,no peripheral edema   ABDOMEN: soft, nontender, no hepatosplenomegaly, no masses and bowel sounds normal  NEURO: Normal strength and tone, mentation intact and speech normal  PSYCH: mentation appears normal, affect normal

## 2021-10-20 ENCOUNTER — MYC MEDICAL ADVICE (OUTPATIENT)
Dept: FAMILY MEDICINE | Facility: CLINIC | Age: 31
End: 2021-10-20

## 2021-10-21 LAB
ALBUMIN SERPL-MCNC: 3.8 G/DL (ref 3.4–5)
ALP SERPL-CCNC: 91 U/L (ref 40–150)
ALT SERPL W P-5'-P-CCNC: 57 U/L (ref 0–50)
AMYLASE SERPL-CCNC: 30 U/L (ref 30–110)
ANION GAP SERPL CALCULATED.3IONS-SCNC: 12 MMOL/L (ref 3–14)
AST SERPL W P-5'-P-CCNC: 46 U/L (ref 0–45)
BILIRUB SERPL-MCNC: 0.3 MG/DL (ref 0.2–1.3)
BUN SERPL-MCNC: 9 MG/DL (ref 7–30)
CALCIUM SERPL-MCNC: 9.3 MG/DL (ref 8.5–10.1)
CHLORIDE BLD-SCNC: 108 MMOL/L (ref 94–109)
CO2 SERPL-SCNC: 23 MMOL/L (ref 20–32)
CREAT SERPL-MCNC: 0.82 MG/DL (ref 0.52–1.04)
GFR SERPL CREATININE-BSD FRML MDRD: >90 ML/MIN/1.73M2
GLUCOSE BLD-MCNC: 89 MG/DL (ref 70–99)
POTASSIUM BLD-SCNC: 4.4 MMOL/L (ref 3.4–5.3)
PROT SERPL-MCNC: 7.6 G/DL (ref 6.8–8.8)
SODIUM SERPL-SCNC: 143 MMOL/L (ref 133–144)

## 2021-10-25 ENCOUNTER — HOSPITAL ENCOUNTER (OUTPATIENT)
Dept: ULTRASOUND IMAGING | Facility: CLINIC | Age: 31
Discharge: HOME | End: 2021-10-25
Attending: FAMILY MEDICINE | Admitting: FAMILY MEDICINE
Payer: COMMERCIAL

## 2021-10-25 DIAGNOSIS — R10.10 RECURRENT UPPER ABDOMINAL PAIN: ICD-10-CM

## 2021-10-25 DIAGNOSIS — R10.10 RECURRENT UPPER ABDOMINAL PAIN: Primary | ICD-10-CM

## 2021-10-25 PROCEDURE — 76700 US EXAM ABDOM COMPLETE: CPT

## 2021-10-26 ENCOUNTER — APPOINTMENT (OUTPATIENT)
Dept: CT IMAGING | Facility: CLINIC | Age: 31
End: 2021-10-26
Attending: EMERGENCY MEDICINE
Payer: COMMERCIAL

## 2021-10-26 ENCOUNTER — HOSPITAL ENCOUNTER (EMERGENCY)
Facility: CLINIC | Age: 31
Discharge: HOME | End: 2021-10-26
Attending: EMERGENCY MEDICINE | Admitting: EMERGENCY MEDICINE
Payer: COMMERCIAL

## 2021-10-26 VITALS
BODY MASS INDEX: 44.2 KG/M2 | RESPIRATION RATE: 60 BRPM | TEMPERATURE: 97.6 F | HEIGHT: 66 IN | WEIGHT: 275 LBS | SYSTOLIC BLOOD PRESSURE: 125 MMHG | OXYGEN SATURATION: 100 % | HEART RATE: 87 BPM | DIASTOLIC BLOOD PRESSURE: 86 MMHG

## 2021-10-26 DIAGNOSIS — R10.13 ABDOMINAL PAIN, EPIGASTRIC: ICD-10-CM

## 2021-10-26 LAB
ALBUMIN SERPL-MCNC: 3.6 G/DL (ref 3.4–5)
ALP SERPL-CCNC: 85 U/L (ref 40–150)
ALT SERPL W P-5'-P-CCNC: 30 U/L (ref 0–50)
ANION GAP SERPL CALCULATED.3IONS-SCNC: 5 MMOL/L (ref 3–14)
AST SERPL W P-5'-P-CCNC: 7 U/L (ref 0–45)
BASOPHILS # BLD AUTO: 0.1 10E3/UL (ref 0–0.2)
BASOPHILS NFR BLD AUTO: 1 %
BILIRUB SERPL-MCNC: 0.3 MG/DL (ref 0.2–1.3)
BUN SERPL-MCNC: 8 MG/DL (ref 7–30)
CALCIUM SERPL-MCNC: 9 MG/DL (ref 8.5–10.1)
CHLORIDE BLD-SCNC: 110 MMOL/L (ref 94–109)
CO2 SERPL-SCNC: 26 MMOL/L (ref 20–32)
CREAT SERPL-MCNC: 0.73 MG/DL (ref 0.52–1.04)
EOSINOPHIL # BLD AUTO: 0.3 10E3/UL (ref 0–0.7)
EOSINOPHIL NFR BLD AUTO: 3 %
ERYTHROCYTE [DISTWIDTH] IN BLOOD BY AUTOMATED COUNT: 13 % (ref 10–15)
GFR SERPL CREATININE-BSD FRML MDRD: >90 ML/MIN/1.73M2
GLUCOSE BLD-MCNC: 99 MG/DL (ref 70–99)
HCT VFR BLD AUTO: 41.6 % (ref 35–47)
HGB BLD-MCNC: 13.5 G/DL (ref 11.7–15.7)
IMM GRANULOCYTES # BLD: 0.1 10E3/UL
IMM GRANULOCYTES NFR BLD: 0 %
LIPASE SERPL-CCNC: 66 U/L (ref 73–393)
LYMPHOCYTES # BLD AUTO: 2.8 10E3/UL (ref 0.8–5.3)
LYMPHOCYTES NFR BLD AUTO: 24 %
MCH RBC QN AUTO: 28.7 PG (ref 26.5–33)
MCHC RBC AUTO-ENTMCNC: 32.5 G/DL (ref 31.5–36.5)
MCV RBC AUTO: 89 FL (ref 78–100)
MONOCYTES # BLD AUTO: 0.8 10E3/UL (ref 0–1.3)
MONOCYTES NFR BLD AUTO: 7 %
NEUTROPHILS # BLD AUTO: 7.7 10E3/UL (ref 1.6–8.3)
NEUTROPHILS NFR BLD AUTO: 65 %
NRBC # BLD AUTO: 0 10E3/UL
NRBC BLD AUTO-RTO: 0 /100
PLATELET # BLD AUTO: 423 10E3/UL (ref 150–450)
POTASSIUM BLD-SCNC: 4.2 MMOL/L (ref 3.4–5.3)
PROT SERPL-MCNC: 7.7 G/DL (ref 6.8–8.8)
RBC # BLD AUTO: 4.7 10E6/UL (ref 3.8–5.2)
SODIUM SERPL-SCNC: 141 MMOL/L (ref 133–144)
WBC # BLD AUTO: 11.7 10E3/UL (ref 4–11)

## 2021-10-26 PROCEDURE — 80053 COMPREHEN METABOLIC PANEL: CPT | Performed by: EMERGENCY MEDICINE

## 2021-10-26 PROCEDURE — 250N000013 HC RX MED GY IP 250 OP 250 PS 637: Performed by: EMERGENCY MEDICINE

## 2021-10-26 PROCEDURE — 250N000011 HC RX IP 250 OP 636: Performed by: EMERGENCY MEDICINE

## 2021-10-26 PROCEDURE — 99285 EMERGENCY DEPT VISIT HI MDM: CPT | Mod: 25

## 2021-10-26 PROCEDURE — 74177 CT ABD & PELVIS W/CONTRAST: CPT

## 2021-10-26 PROCEDURE — 85025 COMPLETE CBC W/AUTO DIFF WBC: CPT | Performed by: EMERGENCY MEDICINE

## 2021-10-26 PROCEDURE — 36415 COLL VENOUS BLD VENIPUNCTURE: CPT | Performed by: EMERGENCY MEDICINE

## 2021-10-26 PROCEDURE — 250N000009 HC RX 250: Performed by: EMERGENCY MEDICINE

## 2021-10-26 PROCEDURE — 83690 ASSAY OF LIPASE: CPT | Performed by: EMERGENCY MEDICINE

## 2021-10-26 RX ORDER — IOPAMIDOL 755 MG/ML
135 INJECTION, SOLUTION INTRAVASCULAR ONCE
Status: COMPLETED | OUTPATIENT
Start: 2021-10-26 | End: 2021-10-26

## 2021-10-26 RX ADMIN — LIDOCAINE HYDROCHLORIDE 30 ML: 20 SOLUTION ORAL; TOPICAL at 05:59

## 2021-10-26 RX ADMIN — IOPAMIDOL 135 ML: 755 INJECTION, SOLUTION INTRAVENOUS at 07:53

## 2021-10-26 RX ADMIN — SODIUM CHLORIDE 79 ML: 900 INJECTION INTRAVENOUS at 07:54

## 2021-10-26 ASSESSMENT — MIFFLIN-ST. JEOR: SCORE: 1979.14

## 2021-10-26 ASSESSMENT — ENCOUNTER SYMPTOMS
ABDOMINAL PAIN: 1
DIARRHEA: 0
NAUSEA: 0
VOMITING: 0

## 2021-10-26 NOTE — ED PROVIDER NOTES
"  History   Chief Complaint:  Abdominal Pain (Epigastric pain )       The history is provided by the patient.      Estefania Barrios is a 31 year old female with history of obesity who presents for evaluation of epigastric abdominal pain. Estefania has had epigastric pain that wraps around to the back for the past month. She had a fasting abdomen US yesterday concerning for a contracted gallbladder and echogenic pancreatitis, so a CT was recommended. She woke up today at 4AM with exacerbated pain, prompting ER presentation. That pain is not changes with diet. No nausea, vomiting, or diarrhea. No bowel movement issues. No history of abdominal surgery. She has not had a period in some time due to birth control use. She does not take ibuprofen on a regular basis.     US Abdomen Complete - 10/25/21:  1.  Contracted gallbladder despite fasting for 8 hours. No acute  cholecystitis.  2.  Diffusely echogenic pancreas, possibly related to peripancreatic  fat infiltration. Consider CT of the abdomen for further  characterization.  Per radiology.      Review of Systems   Gastrointestinal: Positive for abdominal pain. Negative for diarrhea, nausea and vomiting.   All other systems reviewed and are negative.      Allergies:  No Known Allergies    Medications:  Amphetamine-dextroamphetamine   Bupropion   Cholecalciferol   Levonorgestrel-ethinyl estradiol    Past Medical History:     Anxiety  AXEL I  Morbid obesity    Past Surgical History:    Wrist surgery, fracture repair     Family History:    Diabetes type 2  Heart disease  Hypertension    Social History:  Presents with her partner  PCP: Jovani Alvarez     Physical Exam     Patient Vitals for the past 24 hrs:   BP Temp Temp src Pulse Resp SpO2 Height Weight   10/26/21 0541 (!) 149/81 97.6  F (36.4  C) Oral 101 20 98 % 1.676 m (5' 6\") 124.7 kg (275 lb)       Physical Exam  Vitals reviewed.   Constitutional:       Appearance: She is obese.   HENT:      Head: Normocephalic.      " Mouth/Throat:      Mouth: Mucous membranes are moist.   Eyes:      General: No scleral icterus.  Cardiovascular:      Rate and Rhythm: Normal rate and regular rhythm.   Abdominal:      General: Abdomen is flat. Bowel sounds are normal.      Palpations: Abdomen is soft.      Tenderness: There is abdominal tenderness in the epigastric area.   Skin:     General: Skin is warm.      Capillary Refill: Capillary refill takes less than 2 seconds.   Neurological:      General: No focal deficit present.      Mental Status: She is alert.   Psychiatric:         Mood and Affect: Mood normal.         Emergency Department Course     Imaging:  CT Abdomen Pelvis w Contrast   Final Result   IMPRESSION: No acute pathology in the abdomen and pelvis.      JESUS ALVES MD            SYSTEM ID:  [id number]        Report per radiology    Laboratory:  Labs Ordered and Resulted from Time of ED Arrival Up to the Time of Departure from the ED   COMPREHENSIVE METABOLIC PANEL - Abnormal; Notable for the following components:       Result Value    Chloride 110 (*)     All other components within normal limits   LIPASE - Abnormal; Notable for the following components:    Lipase 66 (*)     All other components within normal limits   CBC WITH PLATELETS AND DIFFERENTIAL - Abnormal; Notable for the following components:    WBC Count 11.7 (*)     Absolute Immature Granulocytes 0.1 (*)     All other components within normal limits   CBC WITH PLATELETS & DIFFERENTIAL    Narrative:     The following orders were created for panel order CBC with platelets + differential.  Procedure                               Abnormality         Status                     ---------                               -----------         ------                     CBC with platelets and d...[[id number]]  Abnormal            Final result                 Please view results for these tests on the individual orders.        Emergency Department Course:  Reviewed:  I reviewed  nursing notes, vitals, past medical history and Care Everywhere    Assessments:  0655 I obtained history and examined the patient as noted above.   0831 I rechecked the patient and explained findings.     Interventions:  0559 GI Cocktail (Maalox/Mylanta and viscous Lidocaine), 30 mL suspension, PO      Disposition:  The patient was discharged to home.     Impression & Plan     Medical Decision Making:  `` Patient presents with upper abdominal pain for the last month.  Boyfriend states that may be pain is related to eating greasy or fatty food but patient denies.  Patient had prior outpatient ultrasound that shows no clear gallstones there are some vague hypoechoic nature to the pancreas repeat lab work still shows no ability elevation of lipase ultimately CT was recommended for complete pancreas evaluation and was negative.  High clinical suspicion suspicion for biliary dyskinesia in this  31-year-old female.  I recommend follow-up for HIDA scan as an outpatient as well as considerations of further work-up return with black tarry stool hematemesis fever worsening condition.      Diagnosis:    ICD-10-CM    1. Abdominal pain, epigastric  R10.13        Scribe Disclosure:  I, Elham Hanson, am serving as a scribe at 6:55 AM on 10/26/2021 to document services personally performed by Julien Rivas MD based on my observations and the provider's statements to me.              Julien Rivas MD  10/29/21 1031

## 2021-10-26 NOTE — DISCHARGE INSTRUCTIONS
As we have discussed your who the radiologist does mention some lymph nodes in the abdomen but no abnormality in the pancreas.  You have already had a gallbladder ultrasound.  Pain that comes and goes is sometimes difficult to answer.  Gallbladder pain can be further evaluated with a HIDA scan through your clinic.  Return to the emergency room with constant persistent pain that is intolerable or fever greater than 100 point 4 black tarry stool or red blood in the vomit.  Please also discussed with GI the lymph node findings and make sure these resolve with time.

## 2021-12-06 ENCOUNTER — OFFICE VISIT (OUTPATIENT)
Dept: FAMILY MEDICINE | Facility: CLINIC | Age: 31
End: 2021-12-06
Payer: COMMERCIAL

## 2021-12-06 VITALS
HEART RATE: 102 BPM | RESPIRATION RATE: 18 BRPM | SYSTOLIC BLOOD PRESSURE: 122 MMHG | TEMPERATURE: 97.9 F | WEIGHT: 293 LBS | HEIGHT: 66 IN | BODY MASS INDEX: 47.09 KG/M2 | DIASTOLIC BLOOD PRESSURE: 78 MMHG | OXYGEN SATURATION: 98 %

## 2021-12-06 DIAGNOSIS — L91.8 INFLAMED SKIN TAG: Primary | ICD-10-CM

## 2021-12-06 PROCEDURE — 11200 RMVL SKIN TAGS UP TO&INC 15: CPT | Performed by: PHYSICIAN ASSISTANT

## 2021-12-06 ASSESSMENT — MIFFLIN-ST. JEOR: SCORE: 2069.86

## 2021-12-06 NOTE — PROGRESS NOTES
"  Assessment & Plan       ICD-10-CM    1. Inflamed skin tag  L91.8 DESTRUCT BENIGN LESION, UP TO 14         Return in about 1 week (around 12/13/2021), or if symptoms worsen or fail to improve.    MAD[facility] JULIETA Mac is a 31 year old who presents for the following health issues     HPI     Concern - Derm problem   Onset: since high school but became painful recently - unable to get in with Derm 1/24    Description: painful skin tag on upper left thigh - hurts when rubbing against clothes.   Intensity: mild, moderate  Progression of Symptoms:  same  Accompanying Signs & Symptoms:   Previous history of similar problem:   Precipitating factors:        Worsened by:   Alleviating factors:        Improved by:   Therapies tried and outcome:       Review of Systems   Constitutional, HEENT, cardiovascular, pulmonary, GI, , musculoskeletal, neuro, skin, endocrine and psych systems are negative, except as otherwise noted.      Objective    /78   Pulse 102   Temp 97.9  F (36.6  C) (Tympanic)   Resp 18   Ht 1.676 m (5' 6\")   Wt 133.8 kg (295 lb)   SpO2 98%   BMI 47.61 kg/m    Body mass index is 47.61 kg/m .  Physical Exam   GENERAL:  WDWN, no acute distress  PSYCH: pleasant, cooperative  EYES: no discharge, no injection  HENT:  Normocephalic. Moist mucus membranes.  NECK:  Supple, symmetric  EXTREMITIES:  No gross deformities, moves all 4 limbs spontaneously  SKIN:  Warm and dry. Pedunculated skin tag on upper inner Lt thigh.  NEUROLOGIC:  alert, sensation grossly intact.    Using alcohol for cleansing and 1% Lidocaine with epinephrine for anesthetic, with sterile technique lesion was snipped off at base with straight scissors. Hemostasis was obtained by pressure. Sterile dressing applied and wound care instructions provided. Be alert for any signs of cutaneous infection. The procedure was well tolerated without complications.              "

## 2021-12-26 DIAGNOSIS — Z30.41 USES ORAL CONTRACEPTION: ICD-10-CM

## 2021-12-27 RX ORDER — TIMOLOL MALEATE 5 MG/ML
SOLUTION/ DROPS OPHTHALMIC
Qty: 28 TABLET | Refills: 0 | Status: SHIPPED | OUTPATIENT
Start: 2021-12-27 | End: 2022-01-14

## 2021-12-27 NOTE — TELEPHONE ENCOUNTER
"Requested Prescriptions   Pending Prescriptions Disp Refills     VIENVA 0.1-20 MG-MCG tablet [Pharmacy Med Name: VIENVA 0.1MG/0.02MG TABS 28S] 28 tablet      Sig: TAKE 1 TABLET BY MOUTH DAILY CONTINUOUSLY AS DIRECTED       Contraceptives Protocol Passed - 12/26/2021 11:52 AM        Passed - Patient is not a current smoker if age is 35 or older        Passed - Recent (12 mo) or future (30 days) visit within the authorizing provider's specialty     Patient has had an office visit with the authorizing provider or a provider within the authorizing providers department within the previous 12 mos or has a future within next 30 days. See \"Patient Info\" tab in inbasket, or \"Choose Columns\" in Meds & Orders section of the refill encounter.              Passed - Medication is active on med list        Passed - No active pregnancy on record        Passed - No positive pregnancy test in past 12 months           Last Written Prescription Date:  12/23/20  Last Fill Quantity: 84,  # refills: 4   Last office visit: 12/23/2020 with prescribing provider:  Justin   Future Office Visit: 1/14/22 with Dr. Anderson    Next 5 appointments (look out 90 days)    Jan 24, 2022  9:00 AM  (Arrive by 8:45 AM)  Return Visit with Christy Rosenbaum PA-C  [facility] ([facility] ) [address] [place] [id number] [phone]         Medication is being filled for 1 time refill only due to:  Patient needs to be seen because it has been more than one year since last visit.  Appointment scheduled    Janna De Jesus RN on 12/27/2021 at 6:15 AM        "

## 2022-01-11 ENCOUNTER — TRANSFERRED RECORDS (OUTPATIENT)
Dept: HEALTH INFORMATION MANAGEMENT | Facility: CLINIC | Age: 32
End: 2022-01-11
Payer: COMMERCIAL

## 2022-01-12 NOTE — PROGRESS NOTES
Estefania is a 31 year old  female who presents for annual exam.     Besides routine health maintenance, she has no other health concerns today .    HPI:  Here today for yearly exam --doing well.  Amenorrheic with continuous OCPs.  Had one episode of light spotting last year as breakthrough bleeding.  Nothing problematic.  No cramping.  +SA --no issues.  Denies bowel/bladder issues.  Did see GI last month for some constipation but has been better in the last several months.    ; works as inpatient psych RN at [employer]  -staying active with work but no other formal exercise  +SBE --no isssues; no personal or family hx breast dz  PCP -Dr. Weaver at [facility] --sees as needed; would like fasting labs today  Also sees psychiatry NP for anxiety/depression and recently dx'd with ADHD --had gene testing and was switched from prozac/buspar to wellbutrin based on results; has also started adderall for ADHD after testing with pychologist a couple of months ago  -hx AXEL I in 2018 and  but had negative co-testing with me last year; will repeat pap in 3 yrs  -completed covid vaccine and booster; had flu shot        GYNECOLOGIC HISTORY:    No LMP recorded. (Menstrual status: Birth Control).    Patient takes the pill consistently so she does not have a period.    Her current contraception method is: oral contraceptives.  She  reports that she has quit smoking. She has never used smokeless tobacco.    Patient is sexually active.  STD testing offered?  Declined  Last PHQ-9 score on record =   PHQ-9 SCORE 2022   PHQ-9 Total Score 0     Last GAD7 score on record =   NICOL-7 SCORE 2022   Total Score 0     Alcohol Score = 3    HEALTH MAINTENANCE:  Cholesterol:   Recent Labs   Lab Test 19  0907   CHOL 214*   HDL 46*   *   TRIG 176*     Last Mammo: Not applicable, Result: Not applicable, Next Mammo: Due at age 40   Pap: (  Lab Results   Component Value Date    PAP NIL 2020    PAP ASC-US  "2019    PAP ASC-US 2018      Colonoscopy:  NEVER, Result: Not applicable, Next Colonoscopy: Due at age 45-50 years.  Dexa:  never    Health maintenance updated:  yes    HISTORY:  OB History    Para Term  AB Living   0 0 0 0 0 0   SAB IAB Ectopic Multiple Live Births   0 0 0 0 0       Patient Active Problem List   Diagnosis     Obesity     Morbid obesity (H)     Personal history of cervical dysplasia     Uses oral contraception     Past Surgical History:   Procedure Laterality Date     WRIST SURGERY  2019    broke wrist      Social History     Tobacco Use     Smoking status: Former Smoker     Smokeless tobacco: Never Used     Tobacco comment: quit    Substance Use Topics     Alcohol use: Yes     Comment: occ.      Problem (# of Occurrences) Relation (Name,Age of Onset)    Diabetes (2) Mother, Father: type 2     Heart Disease (1) Mother    Hypertension (1) Mother    No Known Problems (6) Sister, Brother, Maternal Grandmother, Maternal Grandfather, Paternal Grandmother, Other            Current Outpatient Medications   Medication Sig     amphetamine-dextroamphetamine (ADDERALL) 20 MG tablet Take 20 mg by mouth 2 times daily      buPROPion (WELLBUTRIN SR) 100 MG 12 hr tablet      cholecalciferol (VITAMIN D3) 125 mcg (5000 units) capsule      levonorgestrel-ethinyl estradiol (VIENVA) 0.1-20 MG-MCG tablet Take 1 tablet by mouth daily     No current facility-administered medications for this visit.     No Known Allergies    Past medical, surgical, social and family histories were reviewed and updated in EPIC.    ROS:   12 point review of systems negative other than symptoms noted below or in the HPI.  No urinary frequency or dysuria, bladder or kidney problems    EXAM:  /84   Ht 1.676 m (5' 6\")   Wt 125.5 kg (276 lb 9.6 oz)   BMI 44.64 kg/m     BMI: Body mass index is 44.64 kg/m .    PHYSICAL EXAM:  Constitutional:   Appearance: Well nourished, well developed, alert, in no acute " distress  Neck:  Lymph Nodes:  No lymphadenopathy present    Thyroid:  Gland size normal, nontender, no nodules or masses present  on palpation  Chest:  Respiratory Effort:  Breathing unlabored  Cardiovascular:    Heart: Auscultation:  Regular rate, normal rhythm, no murmurs present  Breasts: Palpation of Breasts and Axillae:  No masses present on palpation, no breast tenderness., Axillary Lymph Nodes:  No lymphadenopathy present. and No nodularity, asymmetry or nipple discharge bilaterally.  Gastrointestinal:   Abdominal Examination:  Abdomen nontender to palpation, tone normal without rigidity or guarding, no masses present, umbilicus without lesions   Liver and Spleen:  No hepatomegaly present, liver nontender to palpation    Hernias:  No hernias present  Lymphatic: Lymph Nodes:  No other lymphadenopathy present  Skin:  General Inspection:  No rashes present, no lesions present, no areas of  discoloration  Neurologic:    Mental Status:  Oriented X3.  Normal strength and tone, sensory exam                grossly normal, mentation intact and speech normal.    Psychiatric:   Mentation appears normal and affect normal/bright.         Pelvic Exam:  External Genitalia:     Normal appearance for age, no discharge present, no tenderness present, no inflammatory lesions present, color normal  Vagina:     Normal vaginal vault without central or paravaginal defects, no discharge present, no inflammatory lesions present, no masses present  Bladder:     Nontender to palpation  Urethra:   Urethral Body:  Urethra palpation normal, urethra structural support normal   Urethral Meatus:  No erythema or lesions present  Cervix:     Appearance healthy, no lesions present, nontender to palpation, no bleeding present  Uterus:     Uterus: firm, normal sized and nontender, anteverted in position.   Adnexa:     No adnexal tenderness present, no adnexal masses present  Perineum:     Perineum within normal limits, no evidence of trauma, no  rashes or skin lesions present  Anus:     Anus within normal limits, no hemorrhoids present  Inguinal Lymph Nodes:     No lymphadenopathy present  Pubic Hair:     Normal pubic hair distribution for age  Genitalia and Groin:     No rashes present, no lesions present, no areas of discoloration, no masses present      COUNSELING:   Reviewed preventive health counseling, as reflected in patient instructions  Special attention given to:        Regular exercise       Healthy diet/nutrition    BMI: Body mass index is 44.64 kg/m .  Weight management plan: Discussed healthy diet and exercise guidelines Patient was referred to their PCP to discuss a diet and exercise plan.    ASSESSMENT:  31 year old female with satisfactory annual exam.    ICD-10-CM    1. Encounter for gynecological examination (general) (routine) without abnormal findings  Z01.419    2. Uses oral contraception  Z30.41 levonorgestrel-ethinyl estradiol (VIENVA) 0.1-20 MG-MCG tablet       PLAN:  Patient Instructions   Follow up with your primary care provider and psychiatry NP for your other medical problems.  Continue self breast exam.  Increase physical activity and exercise.  Usual safety and preventative measures counseling done.  BMI >25  Weight loss encouraged.  Last pap smear (2020)  was normal and negative for the DNA of high risk HPV subtypes.  No pap was obtained this year but will repeat at 3yrs due to history of cervical dysplasia.  This was discussed with the patient and she agrees with the plan.      Lashonda Anderson MD

## 2022-01-14 ENCOUNTER — OFFICE VISIT (OUTPATIENT)
Dept: OBGYN | Facility: CLINIC | Age: 32
End: 2022-01-14
Payer: COMMERCIAL

## 2022-01-14 VITALS
DIASTOLIC BLOOD PRESSURE: 84 MMHG | HEIGHT: 66 IN | BODY MASS INDEX: 44.45 KG/M2 | WEIGHT: 276.6 LBS | SYSTOLIC BLOOD PRESSURE: 126 MMHG

## 2022-01-14 DIAGNOSIS — Z13.29 SCREENING FOR THYROID DISORDER: ICD-10-CM

## 2022-01-14 DIAGNOSIS — Z13.220 ENCOUNTER FOR LIPID SCREENING FOR CARDIOVASCULAR DISEASE: ICD-10-CM

## 2022-01-14 DIAGNOSIS — Z30.41 USES ORAL CONTRACEPTION: ICD-10-CM

## 2022-01-14 DIAGNOSIS — Z01.419 ENCOUNTER FOR GYNECOLOGICAL EXAMINATION (GENERAL) (ROUTINE) WITHOUT ABNORMAL FINDINGS: Primary | ICD-10-CM

## 2022-01-14 DIAGNOSIS — Z13.228 SCREENING FOR METABOLIC DISORDER: ICD-10-CM

## 2022-01-14 DIAGNOSIS — Z13.6 ENCOUNTER FOR LIPID SCREENING FOR CARDIOVASCULAR DISEASE: ICD-10-CM

## 2022-01-14 PROCEDURE — 99395 PREV VISIT EST AGE 18-39: CPT | Performed by: OBSTETRICS & GYNECOLOGY

## 2022-01-14 PROCEDURE — 36415 COLL VENOUS BLD VENIPUNCTURE: CPT | Performed by: OBSTETRICS & GYNECOLOGY

## 2022-01-14 PROCEDURE — 80061 LIPID PANEL: CPT | Performed by: OBSTETRICS & GYNECOLOGY

## 2022-01-14 PROCEDURE — 84443 ASSAY THYROID STIM HORMONE: CPT | Performed by: OBSTETRICS & GYNECOLOGY

## 2022-01-14 PROCEDURE — 80053 COMPREHEN METABOLIC PANEL: CPT | Performed by: OBSTETRICS & GYNECOLOGY

## 2022-01-14 RX ORDER — DEXTROAMPHETAMINE SACCHARATE, AMPHETAMINE ASPARTATE, DEXTROAMPHETAMINE SULFATE AND AMPHETAMINE SULFATE 5; 5; 5; 5 MG/1; MG/1; MG/1; MG/1
20 TABLET ORAL 2 TIMES DAILY
COMMUNITY
Start: 2022-01-13

## 2022-01-14 RX ORDER — LEVONORGESTREL/ETHIN.ESTRADIOL 0.1-0.02MG
1 TABLET ORAL DAILY
Qty: 90 TABLET | Refills: 4 | Status: SHIPPED | OUTPATIENT
Start: 2022-01-14 | End: 2022-12-22

## 2022-01-14 ASSESSMENT — ANXIETY QUESTIONNAIRES

## 2022-01-14 ASSESSMENT — PATIENT HEALTH QUESTIONNAIRE - PHQ9
SUM OF ALL RESPONSES TO PHQ QUESTIONS 1-9: 0
5. POOR APPETITE OR OVEREATING: NOT AT ALL

## 2022-01-14 ASSESSMENT — MIFFLIN-ST. JEOR: SCORE: 1986.4

## 2022-01-14 NOTE — PATIENT INSTRUCTIONS
Follow up with your primary care provider and psychiatry NP for your other medical problems.  Continue self breast exam.  Increase physical activity and exercise.  Usual safety and preventative measures counseling done.  BMI >25  Weight loss encouraged.  Last pap smear (2020)  was normal and negative for the DNA of high risk HPV subtypes.  No pap was obtained this year but will repeat at 3yrs due to history of cervical dysplasia.  This was discussed with the patient and she agrees with the plan.

## 2022-01-15 LAB
ALBUMIN SERPL-MCNC: 3.7 G/DL (ref 3.4–5)
ALP SERPL-CCNC: 83 U/L (ref 40–150)
ALT SERPL W P-5'-P-CCNC: 36 U/L (ref 0–50)
ANION GAP SERPL CALCULATED.3IONS-SCNC: 9 MMOL/L (ref 3–14)
AST SERPL W P-5'-P-CCNC: 20 U/L (ref 0–45)
BILIRUB SERPL-MCNC: 0.4 MG/DL (ref 0.2–1.3)
BUN SERPL-MCNC: 9 MG/DL (ref 7–30)
CALCIUM SERPL-MCNC: 9.2 MG/DL (ref 8.5–10.1)
CHLORIDE BLD-SCNC: 109 MMOL/L (ref 94–109)
CHOLEST SERPL-MCNC: 198 MG/DL
CO2 SERPL-SCNC: 22 MMOL/L (ref 20–32)
CREAT SERPL-MCNC: 0.8 MG/DL (ref 0.52–1.04)
FASTING STATUS PATIENT QL REPORTED: YES
GFR SERPL CREATININE-BSD FRML MDRD: >90 ML/MIN/1.73M2
GLUCOSE BLD-MCNC: 96 MG/DL (ref 70–99)
HDLC SERPL-MCNC: 40 MG/DL
LDLC SERPL CALC-MCNC: 132 MG/DL
NONHDLC SERPL-MCNC: 158 MG/DL
POTASSIUM BLD-SCNC: 4 MMOL/L (ref 3.4–5.3)
PROT SERPL-MCNC: 7.4 G/DL (ref 6.8–8.8)
SODIUM SERPL-SCNC: 140 MMOL/L (ref 133–144)
TRIGL SERPL-MCNC: 129 MG/DL
TSH SERPL DL<=0.005 MIU/L-ACNC: 2.12 MU/L (ref 0.4–4)

## 2022-01-15 ASSESSMENT — ANXIETY QUESTIONNAIRES: GAD7 TOTAL SCORE: 0

## 2022-01-16 NOTE — RESULT ENCOUNTER NOTE
Please inform of results --metabolic pan and thyroid normal.  Cholesterol just slightly elevated --work had on diet and exercise as we discussed to bring her LDL back down

## 2022-02-09 ENCOUNTER — E-VISIT (OUTPATIENT)
Dept: URGENT CARE | Facility: CLINIC | Age: 32
End: 2022-02-09
Payer: COMMERCIAL

## 2022-02-09 DIAGNOSIS — R21 RASH: Primary | ICD-10-CM

## 2022-02-09 PROCEDURE — 99207 PR NON-BILLABLE SERV PER CHARTING: CPT | Performed by: NURSE PRACTITIONER

## 2022-02-09 NOTE — PATIENT INSTRUCTIONS
Dear Estefania Barrios?     After reviewing your responses, I am unable to make a diagnosis that can be treated online.    You will not be charged for this eVisit.     We are dedicated to helping you achieve your best health and would like to see you in one of our many clinic locations - a primary care provider would be ideal for your concern.    Please use [employer]ie to schedule a visit with a provider or call [phone] ([phone]) to schedule at any of our locations.    Thanks for choosing?us?as your health care partner,?   ?   Sneha Galloway, TONY CNP?

## 2022-02-11 ENCOUNTER — E-VISIT (OUTPATIENT)
Dept: URGENT CARE | Facility: CLINIC | Age: 32
End: 2022-02-11
Payer: COMMERCIAL

## 2022-02-11 DIAGNOSIS — B36.0 TINEA VERSICOLOR: Primary | ICD-10-CM

## 2022-02-11 PROCEDURE — 99421 OL DIG E/M SVC 5-10 MIN: CPT | Performed by: PHYSICIAN ASSISTANT

## 2022-02-11 RX ORDER — KETOCONAZOLE 20 MG/ML
SHAMPOO TOPICAL
Qty: 120 ML | Refills: 1 | Status: SHIPPED | OUTPATIENT
Start: 2022-02-11

## 2022-02-11 NOTE — PATIENT INSTRUCTIONS
Tinea Versicolor  Tinea versicolor is a rash caused by a fungus in the top layers of the skin. This fungus is normally present in the pores of the skin and causes no symptoms. But when the fungus overgrows, it causes a rash. The fungus grows more easily in hot climates, and on oily or sweaty skin. Health experts don t know why some people get this rash and others don t. Experts also don t know why the rash will suddenly appear in someone who has never had it before.   The rash is made up of irregular pale or tan spots and patches. The rash is usually on the neck, upper back, chest, and shoulders. You may have mild itching, especially if you become overheated. But it doesn't cause other symptoms. Because these spots don't change color with sun exposure like normal skin, the rash may be lighter or darker than your normal skin.   This rash is harmless and usually causes no symptoms. The only reason for treatment is to improve appearance. Follow the suggestions below to clear the rash. It might take several months for normal skin color to return.   Home care    Use a special medicated shampoo over your whole body while in the shower. Don t use soap. Let the shampoo stay on for about 10 minutes before rinsing off. Do this every day for one week.    As a different treatment, you may buy an antifungal cream (miconazole or clotrimazole, both available without a prescription). Use this daily for 2 weeks. .     This rash is not contagious to others. It can t be spread if someone touches it. So you don t have to worry about exposing others at school, , or work.    Your healthcare provider may also prescribe oral antifungal medicines to help stop the rash.  Prevention  This fungus can come back again (recur) after treatment. To prevent return of the rash, use medicated dandruff shampoo over your whole body when in the shower. Do this once a month for the next year. This is very important to do in the summertime. That  "is when the rash is most likely to recur.   Other prevention tips include:    Don't use oily skin products    Wear loose clothing. Try to let your skin stay cool and breathe.    Use sunscreen and protect yourself from sunlight    Don't use tanning beds  Follow-up care  Follow up with your healthcare provider, or as advised. Call your provider if the rash doesn t get better with the above treatment, or if new symptoms appear.   When to seek medical advice  Call your healthcare provider right away if any of these occur:    Increasing redness of the rash    Change in appearance of the rash    Fever of 100.4 F (38 C) or higher, or as directed by your provider  [employer] last reviewed this educational content on 8/1/2019 2000-2021 [employer]. All rights reserved. This information is not intended as a substitute for professional medical care. Always follow your healthcare professional's instructions.      Dear Estefania Barrios    After reviewing your responses, I've been able to diagnose you with \"tinea\" which is a common skin condition caused by a fungal infection. There are many common names for this depending on where it is located and it's appearance (jock itch, athlete's foot, ringworm, etc).  Based on your responses, I have prescribed a topical shampoo  to treat this. Please follow the instructions on the medication. If you experience irritation of your skin, new rash, or any other new symptoms, you should stop using this medication and contact your primary care provider.  If this treatment does not work for you in 2 weeks or after completing treatment, please plan to follow-up with your primary care provider to evaluate in-person.  Self-care:  Wash all items that come into contact with infected skin: Wash all towels, clothes, and bedding in hot water. Use laundry soap. Clean shower stalls, mats, and floors with a germ-killing or fungus-killing .   Do not share personal items: Do not share " towels, brushes, [place], or hair accessories.    Keep your skin, hair, and nails clean and dry: Bathe every day, and dry your skin before you put medicine on the infected area. Wash your hands often. Do not scratch your sores. This may cause the infection to spread.   Avoid infected pets: A patch of missing fur is a sign of infection in a pet. Take your infected pet to a  for treatment.  Contact your primary healthcare provider if:  You have a fever.    Your infection continues to spread after 7 days of treatment.   Your rash is not gone in 2 weeks.   The area around your rash becomes red, warm, tender, swollen, or smells bad.   You have questions or concerns about your condition or care.    Thanks for choosing?us?as your health care partner,    Yun Velez PA-C

## 2022-03-22 ENCOUNTER — LAB (OUTPATIENT)
Dept: URGENT CARE | Facility: URGENT CARE | Age: 32
End: 2022-03-22
Attending: FAMILY MEDICINE
Payer: COMMERCIAL

## 2022-03-22 ENCOUNTER — E-VISIT (OUTPATIENT)
Dept: URGENT CARE | Facility: URGENT CARE | Age: 32
End: 2022-03-22
Payer: COMMERCIAL

## 2022-03-22 DIAGNOSIS — Z20.822 SUSPECTED COVID-19 VIRUS INFECTION: ICD-10-CM

## 2022-03-22 DIAGNOSIS — J02.9 SORE THROAT: ICD-10-CM

## 2022-03-22 LAB
DEPRECATED S PYO AG THROAT QL EIA: NEGATIVE
GROUP A STREP BY PCR: NOT DETECTED
SARS-COV-2 RNA RESP QL NAA+PROBE: NEGATIVE

## 2022-03-22 PROCEDURE — U0005 INFEC AGEN DETEC AMPLI PROBE: HCPCS

## 2022-03-22 PROCEDURE — 87651 STREP A DNA AMP PROBE: CPT

## 2022-03-22 PROCEDURE — U0003 INFECTIOUS AGENT DETECTION BY NUCLEIC ACID (DNA OR RNA); SEVERE ACUTE RESPIRATORY SYNDROME CORONAVIRUS 2 (SARS-COV-2) (CORONAVIRUS DISEASE [COVID-19]), AMPLIFIED PROBE TECHNIQUE, MAKING USE OF HIGH THROUGHPUT TECHNOLOGIES AS DESCRIBED BY CMS-2020-01-R: HCPCS

## 2022-03-22 PROCEDURE — 99421 OL DIG E/M SVC 5-10 MIN: CPT | Performed by: FAMILY MEDICINE

## 2022-03-22 NOTE — PATIENT INSTRUCTIONS
Dear Estefania,    Your symptoms show that you may have coronavirus (COVID-19). This illness can cause fever, cough and trouble breathing. Many people get a mild case and get better on their own. Some people can get very sick.    Because you also reported sore throat, I would like to also test you for Strep Throat to determine if we need to treat you for that as well.    What should I do?  For all employees or close contacts (except [place] and [place] - see below), go to your [employer] home page and scroll down to the section that says  You have an appointment that needs to be scheduled  and click the large green button that says  Schedule Now  and follow the steps to find the next available opening.  It is important that when you are asked what the reason for your appointment is that you mention you need BOTH Covid and Strep tests.    If you are unable to complete these steps or if you cannot find any available times, please call [phone] to schedule employee testing.     [place] employees or close contacts, please call [phone].   [place]) employees or close contacts call [phone].    Return to work guidance:   Please let your workplace manager and staffing office know when your isolation ends.   Note: if you tested through [facility], there is no need to report to [facility]. If you did not test through [facility], send a copy of your results to [email or web address]. [place] call [phone],  [place] call [phone].     Please visit the Employee COVID-19 Testing Information page on the COVID-19 [employer] site. Here you will find return to work and testing guidance, high and low risk exposure definitions, and frequently asked questions.   [employer] URL: [email or web address]     How can I take care of myself?  Over the counter medications may help with your symptoms such as runny or stuffy nose,  cough, chills, or fever.  Talk to your care team about your options.     Some people are at high risk of severe illness (for example, you have a weak immune system, you re 65 years or older, or you have certain medical problems). If your risk is high and your symptoms started in the last 5 to 7 days, we strongly recommend for you to get COVID treatment as soon as possible. Paxlovid, Molnupiravir and the monoclonal antibody treatments are proven safe and effective, make you feel better faster, and prevent hospitalization and death.       To schedule an appointment to discuss COVID treatment, request an appointment on [facility] (select  COVID-19 Treatment ) or call [facility] ([phone]), press 7.      Get lots of rest. Drink extra fluids (unless a doctor has told you not to)    Take Tylenol (acetaminophen) or ibuprofen for fever or pain. If you have liver or kidney problems, ask your family doctor if it's okay to take Tylenol or ibuprofen    Take over the counter medications for your symptoms, as directed by your doctor. You may also talk to your pharmacist.      If you have other health problems (like cancer, heart failure, an organ transplant or severe kidney disease): Call your specialty clinic if you don't feel better in the next 2 days.    Know when to call 911. Emergency warning signs include:  o Trouble breathing or shortness of breath  o Pain or pressure in the chest that doesn't go away  o Feeling confused like you haven't felt before, or not being able to wake up  o Bluish-colored lips or face  Where can I get more information?    [facility] - About COVID-19: [email or web address]     CDC - What to Do If You're Sick: www.cdc.gov/coronavirus/2019-ncov/about/steps-when-sick.html    CDC - Ending Home Isolation: https://www.cdc.gov/coronavirus/2019-ncov/your-health/quarantine-isolation.html     [facility] clinical trials (COVID-19 research studies):  [email or web address]-[email or web address]-trials    Below are the COVID-19 hotlines at the [place] [facility] ([facility]). Interpreters are available.  o For health questions: Call 266-138-4889 or 1-304.319.5671 (7 a.m. to 7 p.m.)  o For questions about schools and childcare: Call 518-072-3932 or 1-391.463.4535 (7 a.m. to 7 p.m.)

## 2022-03-23 ENCOUNTER — E-VISIT (OUTPATIENT)
Dept: URGENT CARE | Facility: CLINIC | Age: 32
End: 2022-03-23
Payer: COMMERCIAL

## 2022-03-23 DIAGNOSIS — R09.81 NASAL CONGESTION: Primary | ICD-10-CM

## 2022-03-23 PROCEDURE — 99207 PR NON-BILLABLE SERV PER CHARTING: CPT | Performed by: NURSE PRACTITIONER

## 2022-03-24 ENCOUNTER — OFFICE VISIT (OUTPATIENT)
Dept: FAMILY MEDICINE | Facility: CLINIC | Age: 32
End: 2022-03-24
Payer: COMMERCIAL

## 2022-03-24 VITALS
SYSTOLIC BLOOD PRESSURE: 132 MMHG | HEART RATE: 105 BPM | TEMPERATURE: 97.7 F | DIASTOLIC BLOOD PRESSURE: 84 MMHG | OXYGEN SATURATION: 100 % | WEIGHT: 278 LBS | BODY MASS INDEX: 44.87 KG/M2

## 2022-03-24 DIAGNOSIS — J01.90 ACUTE BACTERIAL SINUSITIS: Primary | ICD-10-CM

## 2022-03-24 DIAGNOSIS — B96.89 ACUTE BACTERIAL SINUSITIS: Primary | ICD-10-CM

## 2022-03-24 PROCEDURE — 99213 OFFICE O/P EST LOW 20 MIN: CPT | Performed by: INTERNAL MEDICINE

## 2022-03-24 ASSESSMENT — PAIN SCALES - GENERAL: PAINLEVEL: NO PAIN (0)

## 2022-03-24 NOTE — PROGRESS NOTES
Assessment & Plan     Acute bacterial sinusitis  5 days of moderate to severe sinus pressure and pain, not responsive to aggressive conservative management, reasonable to treat with abx.   - amoxicillin-clavulanate (AUGMENTIN) 875-125 MG tablet; Take 1 tablet by mouth 2 times daily for 7 days            Return in about 3 days (around 3/27/2022) for If no improvement.    Laura Camp MD  [facility] JULIETA Mac is a 31 year old who presents for the following health issues    Sinus Problem     History of Present Illness       Reason for visit:  Sinus issues  Symptom onset:  3-7 days ago  Symptoms include:  Earache, sinus pressure, Post nasal drip, cough  Symptom intensity:  Moderate  Symptom progression:  Staying the same  Had these symptoms before:  Yes  Has tried/received treatment for these symptoms:  Yes  Previous treatment was successful:  Yes  Prior treatment description:  Steriods  What makes it worse:  Laying flat  What makes it better:  Not really    She eats 2-3 servings of fruits and vegetables daily.She consumes 3 sweetened beverage(s) daily.She exercises with enough effort to increase her heart rate 10 to 19 minutes per day.  She exercises with enough effort to increase her heart rate 3 or less days per week.   She is taking medications regularly.     Developed sinus pressure, congestion, headache, ear pressure about 5 days ago.  Has tried Flonase and decongestant at home without much relief. Continues to have moderate amount of pressure in head resulting in headache, ear ache, and facial/mouth tenderness. Intermittent cough due to postnasal drip. Denies shortness of breath or difficulty breathing. Denies fever or chills. Reports frequent sinus infections in the past and feels her current symptoms are similar.       Review of Systems   Constitutional, HEENT, cardiovascular, pulmonary systems are negative, except as otherwise noted.      Objective    /84    Pulse 105   Temp 97.7  F (36.5  C) (Tympanic)   Wt 126.1 kg (278 lb)   SpO2 100%   BMI 44.87 kg/m    Body mass index is 44.87 kg/m .  Physical Exam   Gen: well appearing, pleasant woman, no distress  HEENT: PERRL, no conjunctival injection, no posterior pharynx erythema, MMM.  TM normal b/l.  + sinus tenderness.   Neck: supple, no LAD  Pulm: breathing comfortably, CTAB, no wheezes or rales  CV: RRR, normal S1 and S2, no murmurs  Ext: 2+ radial pulses

## 2022-06-16 ENCOUNTER — TRANSFERRED RECORDS (OUTPATIENT)
Dept: HEALTH INFORMATION MANAGEMENT | Facility: CLINIC | Age: 32
End: 2022-06-16

## 2022-08-19 ENCOUNTER — OFFICE VISIT (OUTPATIENT)
Dept: FAMILY MEDICINE | Facility: CLINIC | Age: 32
End: 2022-08-19
Payer: COMMERCIAL

## 2022-08-19 VITALS
OXYGEN SATURATION: 100 % | SYSTOLIC BLOOD PRESSURE: 137 MMHG | BODY MASS INDEX: 45.48 KG/M2 | HEART RATE: 112 BPM | HEIGHT: 66 IN | WEIGHT: 283 LBS | DIASTOLIC BLOOD PRESSURE: 85 MMHG

## 2022-08-19 DIAGNOSIS — H10.31 ACUTE CONJUNCTIVITIS OF RIGHT EYE, UNSPECIFIED ACUTE CONJUNCTIVITIS TYPE: Primary | ICD-10-CM

## 2022-08-19 PROCEDURE — 99213 OFFICE O/P EST LOW 20 MIN: CPT | Performed by: PHYSICIAN ASSISTANT

## 2022-08-19 RX ORDER — POLYMYXIN B SULFATE AND TRIMETHOPRIM 1; 10000 MG/ML; [USP'U]/ML
1-2 SOLUTION OPHTHALMIC EVERY 4 HOURS
Qty: 10 ML | Refills: 0 | Status: SHIPPED | OUTPATIENT
Start: 2022-08-19 | End: 2022-11-14

## 2022-08-19 ASSESSMENT — PAIN SCALES - GENERAL: PAINLEVEL: MODERATE PAIN (4)

## 2022-08-19 ASSESSMENT — ENCOUNTER SYMPTOMS: EYE PAIN: 1

## 2022-08-19 NOTE — PROGRESS NOTES
"Assessment and Plan:     (H10.31) Acute conjunctivitis of right eye, unspecified acute conjunctivitis type  (primary encounter diagnosis)  Comment: recurrent, has seen ophthalmology and rheumatology, could be inflammatory, polymixin has been helpful in the past  Plan: trimethoprim-polymyxin b (POLYTRIM) 63674-6.1         UNIT/ML-% ophthalmic solution  Follow-up with ophthalmology if does not resolve      Annalee Gtz PA-C        Mehreen Mac is a 31 year old presenting for the following health issues:  Eye Problem      Eye Problem     History of Present Illness       Reason for visit:  R eye redness and discomfort - no vision changes  Symptom onset:  3-4 weeks ago  Symptoms include:  Discomfort, redness  Symptom intensity:  Moderate  Symptom progression:  Staying the same  Had these symptoms before:  Yes  Has tried/received treatment for these symptoms:  Yes  Previous treatment was successful:  Yes  Prior treatment description:  Medicated eye drops - андрей/poly/dex  What makes it worse:  Nothing  What makes it better:  Nothing    She eats 2-3 servings of fruits and vegetables daily.She consumes 3 sweetened beverage(s) daily.She exercises with enough effort to increase her heart rate 10 to 19 minutes per day.  She exercises with enough effort to increase her heart rate 3 or less days per week.   She is taking medications regularly.       Right eye red, painful  She has had work-up for autoimmune disorder and saw endo and was told to follow-up but she hasn't had follow-up yet, this was in 2020  No vision changes, no itching, fever/chills, headache, vomiting   She has had symptoms in the past and resolved with polytrim  She uses systane regularly for dry eyes  She does not use contact lenses     Review of Systems   Eyes: Positive for pain.      See above      Objective    /85 (BP Location: Left arm, Patient Position: Sitting, Cuff Size: Adult Large)   Pulse 112   Ht 1.676 m (5' 6\")   Wt 128.4 " kg (283 lb)   SpO2 100%   BMI 45.68 kg/m    Body mass index is 45.68 kg/m .     Physical Exam     GENERAL: healthy, alert and no distress  EYE: mild injection right sclera, no drainage, no periorbital swelling, flourescein stain negative for abrasion/ulcer  RESP: lungs clear to auscultation - no rales, no rhonchi, no wheezes  CV: regular rates and rhythm, normal S1 S2, no S3 or S4 and no murmur, no click or rub                 .  ..

## 2022-08-22 DIAGNOSIS — H10.9 CONJUNCTIVITIS: Primary | ICD-10-CM

## 2022-08-22 DIAGNOSIS — M25.50 PAIN IN JOINT: ICD-10-CM

## 2022-08-24 ENCOUNTER — LAB (OUTPATIENT)
Dept: LAB | Facility: CLINIC | Age: 32
End: 2022-08-24
Payer: COMMERCIAL

## 2022-08-24 DIAGNOSIS — M25.50 PAIN IN JOINT: ICD-10-CM

## 2022-08-24 DIAGNOSIS — H10.9 CONJUNCTIVITIS: ICD-10-CM

## 2022-08-24 LAB
ALBUMIN SERPL-MCNC: 3.6 G/DL (ref 3.4–5)
ALBUMIN UR-MCNC: NEGATIVE MG/DL
ALP SERPL-CCNC: 73 U/L (ref 40–150)
APPEARANCE UR: ABNORMAL
AST SERPL W P-5'-P-CCNC: 19 U/L (ref 0–45)
BACTERIA #/AREA URNS HPF: ABNORMAL /HPF
BASOPHILS # BLD AUTO: 0.1 10E3/UL (ref 0–0.2)
BASOPHILS NFR BLD AUTO: 1 %
BILIRUB UR QL STRIP: NEGATIVE
CALCIUM SERPL-MCNC: 9.2 MG/DL (ref 8.5–10.1)
CK SERPL-CCNC: 62 U/L (ref 30–225)
COLOR UR AUTO: YELLOW
CREAT SERPL-MCNC: 0.65 MG/DL (ref 0.52–1.04)
CRP SERPL-MCNC: 11.6 MG/L
EOSINOPHIL # BLD AUTO: 0.2 10E3/UL (ref 0–0.7)
EOSINOPHIL NFR BLD AUTO: 3 %
ERYTHROCYTE [DISTWIDTH] IN BLOOD BY AUTOMATED COUNT: 13.2 % (ref 10–15)
ERYTHROCYTE [SEDIMENTATION RATE] IN BLOOD BY WESTERGREN METHOD: 13 MM/HR (ref 0–20)
GFR SERPL CREATININE-BSD FRML MDRD: >90 ML/MIN/1.73M2
GLUCOSE UR STRIP-MCNC: NEGATIVE MG/DL
HCT VFR BLD AUTO: 41.4 % (ref 35–47)
HGB BLD-MCNC: 13.5 G/DL (ref 11.7–15.7)
HGB UR QL STRIP: ABNORMAL
KETONES UR STRIP-MCNC: NEGATIVE MG/DL
LEUKOCYTE ESTERASE UR QL STRIP: ABNORMAL
LYMPHOCYTES # BLD AUTO: 2.9 10E3/UL (ref 0.8–5.3)
LYMPHOCYTES NFR BLD AUTO: 30 %
MCH RBC QN AUTO: 29.3 PG (ref 26.5–33)
MCHC RBC AUTO-ENTMCNC: 32.6 G/DL (ref 31.5–36.5)
MCV RBC AUTO: 90 FL (ref 78–100)
MONOCYTES # BLD AUTO: 0.6 10E3/UL (ref 0–1.3)
MONOCYTES NFR BLD AUTO: 6 %
NEUTROPHILS # BLD AUTO: 5.9 10E3/UL (ref 1.6–8.3)
NEUTROPHILS NFR BLD AUTO: 61 %
NITRATE UR QL: NEGATIVE
PH UR STRIP: 5.5 [PH] (ref 5–7)
PLATELET # BLD AUTO: 390 10E3/UL (ref 150–450)
RBC # BLD AUTO: 4.6 10E6/UL (ref 3.8–5.2)
RBC #/AREA URNS AUTO: ABNORMAL /HPF
SP GR UR STRIP: >=1.03 (ref 1–1.03)
SQUAMOUS #/AREA URNS AUTO: ABNORMAL /LPF
URATE SERPL-MCNC: 7.1 MG/DL (ref 2.6–6)
UROBILINOGEN UR STRIP-ACNC: 0.2 E.U./DL
WBC # BLD AUTO: 9.6 10E3/UL (ref 4–11)
WBC #/AREA URNS AUTO: ABNORMAL /HPF

## 2022-08-24 PROCEDURE — 84450 TRANSFERASE (AST) (SGOT): CPT

## 2022-08-24 PROCEDURE — 81001 URINALYSIS AUTO W/SCOPE: CPT

## 2022-08-24 PROCEDURE — 85652 RBC SED RATE AUTOMATED: CPT

## 2022-08-24 PROCEDURE — 82565 ASSAY OF CREATININE: CPT

## 2022-08-24 PROCEDURE — 82550 ASSAY OF CK (CPK): CPT

## 2022-08-24 PROCEDURE — 36415 COLL VENOUS BLD VENIPUNCTURE: CPT

## 2022-08-24 PROCEDURE — 85025 COMPLETE CBC W/AUTO DIFF WBC: CPT

## 2022-08-24 PROCEDURE — 84075 ASSAY ALKALINE PHOSPHATASE: CPT

## 2022-08-24 PROCEDURE — 86140 C-REACTIVE PROTEIN: CPT

## 2022-08-24 PROCEDURE — 84550 ASSAY OF BLOOD/URIC ACID: CPT

## 2022-08-24 PROCEDURE — 82040 ASSAY OF SERUM ALBUMIN: CPT

## 2022-08-24 PROCEDURE — 82310 ASSAY OF CALCIUM: CPT

## 2022-09-18 ENCOUNTER — HEALTH MAINTENANCE LETTER (OUTPATIENT)
Age: 32
End: 2022-09-18

## 2022-10-30 ENCOUNTER — TRANSFERRED RECORDS (OUTPATIENT)
Dept: HEALTH INFORMATION MANAGEMENT | Facility: CLINIC | Age: 32
End: 2022-10-30

## 2022-11-14 ENCOUNTER — OFFICE VISIT (OUTPATIENT)
Dept: INTERNAL MEDICINE | Facility: CLINIC | Age: 32
End: 2022-11-14
Payer: COMMERCIAL

## 2022-11-14 VITALS
OXYGEN SATURATION: 97 % | HEART RATE: 118 BPM | RESPIRATION RATE: 18 BRPM | DIASTOLIC BLOOD PRESSURE: 94 MMHG | SYSTOLIC BLOOD PRESSURE: 146 MMHG

## 2022-11-14 DIAGNOSIS — L84 CORN OR CALLUS: Primary | ICD-10-CM

## 2022-11-14 PROCEDURE — 99213 OFFICE O/P EST LOW 20 MIN: CPT | Performed by: PHYSICIAN ASSISTANT

## 2022-11-14 NOTE — PROGRESS NOTES
"  Assessment & Plan     Corn or callus  Reviewed soaking the foot  OTC corn pad   Monitor                BMI:   Estimated body mass index is 45.68 kg/m  as calculated from the following:    Height as of 8/19/22: 1.676 m (5' 6\").    Weight as of 8/19/22: 128.4 kg (283 lb).   Weight management plan: Patient was referred to their PCP to discuss a diet and exercise plan.        Return in about 4 weeks (around 12/12/2022) for recheck if not improving, regular primary provider.    Niecy Queen PA-C  [facility] is a 32 year old, presenting for the following health issues:  Foot Injury      History of Present Illness       Reason for visit:  Something in bottom of R foot  Symptom onset:  1-2 weeks ago  Symptom intensity:  Moderate  Symptom progression:  Staying the same  Had these symptoms before:  No   Feeling of foreign body/irriation the side of the right foot             Review of Systems         Objective    BP (!) 146/94   Pulse 118   Resp 18   SpO2 97%   There is no height or weight on file to calculate BMI.  Physical Exam   GENERAL: healthy, alert and no distress  MS: no gross musculoskeletal defects noted, no edema  SKIN: right foot lateral side   With thickened skin noted, circular area  Area cleansed with alcohol swab and 15 blade use to remove callous/corn tissue  No foreign body seen                     "

## 2022-12-07 ENCOUNTER — E-VISIT (OUTPATIENT)
Dept: URGENT CARE | Facility: CLINIC | Age: 32
End: 2022-12-07
Payer: COMMERCIAL

## 2022-12-07 DIAGNOSIS — B96.89 ACUTE BACTERIAL SINUSITIS: Primary | ICD-10-CM

## 2022-12-07 DIAGNOSIS — J01.90 ACUTE BACTERIAL SINUSITIS: Primary | ICD-10-CM

## 2022-12-07 PROCEDURE — 99421 OL DIG E/M SVC 5-10 MIN: CPT | Performed by: FAMILY MEDICINE

## 2022-12-07 NOTE — PATIENT INSTRUCTIONS
Dear Estefania Barrios    After reviewing your responses, I've been able to diagnose you with Acute bacterial sinusitis.      Based on your responses and diagnosis, I have prescribed   Orders Placed This Encounter     amoxicillin-clavulanate (AUGMENTIN) 875-125 MG tablet    to treat your symptoms. I have sent this to your pharmacy.?     It is also important to stay well hydrated, get lots of rest and take over-the-counter decongestants,?tylenol?or ibuprofen if you?are able to?take those medications per your primary care provider to help relieve discomfort.?     It is important that you take?all of?your prescribed medication even if your symptoms are improving after a few doses.? Taking?all of?your medicine helps prevent the symptoms from returning.?     If your symptoms worsen, you develop severe headache, vomiting, high fever (>102), or are not improving in 7 days, please contact your primary care provider for an appointment or visit any of our convenient Walk-in Care or Urgent Care Centers to be seen which can be found on our website?here.?     Thanks again for choosing?us?as your health care partner,?   ?  Chata Pza MD?

## 2022-12-22 ENCOUNTER — MYC MEDICAL ADVICE (OUTPATIENT)
Dept: OBGYN | Facility: CLINIC | Age: 32
End: 2022-12-22

## 2022-12-22 DIAGNOSIS — Z30.41 USES ORAL CONTRACEPTION: ICD-10-CM

## 2022-12-22 RX ORDER — LEVONORGESTREL/ETHIN.ESTRADIOL 0.1-0.02MG
1 TABLET ORAL DAILY
Qty: 90 TABLET | Refills: 0 | Status: SHIPPED | OUTPATIENT
Start: 2022-12-22 | End: 2023-02-17

## 2022-12-22 NOTE — TELEPHONE ENCOUNTER
Requested Prescriptions   Pending Prescriptions Disp Refills     levonorgestrel-ethinyl estradiol (VIENVA) 0.1-20 MG-MCG tablet 90 tablet 0     Sig: Take 1 tablet by mouth daily       There is no refill protocol information for this order        Next 5 appointments (look out 90 days)    Feb 28, 2023 10:00 AM  PHYSICAL with Lashonda Anderson MD  [facility] for [facility] ([facility] ) [address] [place] [id number] [phone]        Prescription approved per [facility] Refill Protocol.  Christine Valadez RN on 12/22/2022 at 2:48 PM

## 2023-02-17 ENCOUNTER — MYC REFILL (OUTPATIENT)
Dept: OBGYN | Facility: CLINIC | Age: 33
End: 2023-02-17
Payer: COMMERCIAL

## 2023-02-17 DIAGNOSIS — Z30.41 USES ORAL CONTRACEPTION: ICD-10-CM

## 2023-02-17 RX ORDER — LEVONORGESTREL/ETHIN.ESTRADIOL 0.1-0.02MG
1 TABLET ORAL DAILY
Qty: 90 TABLET | Refills: 0 | Status: SHIPPED | OUTPATIENT
Start: 2023-02-17 | End: 2023-02-28

## 2023-02-17 NOTE — TELEPHONE ENCOUNTER
"Requested Prescriptions   Pending Prescriptions Disp Refills     levonorgestrel-ethinyl estradiol (VIENVA) 0.1-20 MG-MCG tablet 90 tablet 0     Sig: Take 1 tablet by mouth daily       Contraceptives Protocol Failed - 2/17/2023  6:15 AM        Failed - Recent (12 mo) or future (30 days) visit within the authorizing provider's specialty     Patient has had an office visit with the authorizing provider or a provider within the authorizing providers department within the previous 12 mos or has a future within next 30 days. See \"Patient Info\" tab in inbasket, or \"Choose Columns\" in Meds & Orders section of the refill encounter.              Passed - Patient is not a current smoker if age is 35 or older        Passed - Medication is active on med list        Passed - No active pregnancy on record        Passed - No positive pregnancy test in past 12 months             Last Written Prescription Date:  12/22/22  Last Fill Quantity: #90  Last office visit: 1/14/22      Medication is being filled for 1 time refill only due to:  Patient needs to be seen because it has been more than one year since last visit. has future appt set up    Future Appointments 2/17/2023 - 8/16/2023      Date Visit Type Length Department Provider     2/28/2023 10:00 AM PHYSICAL 20 min [facility] Lashonda Anderson MD    Location Instructions:     The clinic is located at [address] [place] [address], [address] [id number]                   "

## 2023-02-22 NOTE — PROGRESS NOTES
Estefania is a 32 year old  female who presents for annual exam.     Besides routine health maintenance, she has no other health concerns today .    HPI:  Here today for yearly exam --doing well.  Amenorrheic with continuous OCPs.  No breakthrough bleeding/spotting.  +SA --no issues/concerns.  Denies bowel/bladder issues.  Did see GI specialist this year for concerns about inconsistent bowel habits --added fiber, probiotics, etc.      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 (Menstrual status: Birth Control).    Regular menses? No, not with birth control    Her current contraception method is: oral contraceptives.  She  reports that she has quit smoking. She has never used smokeless tobacco.    Patient is sexually active.  STD testing offered?  Declined  Last PHQ-9 score on record =   PHQ-9 SCORE 2023   PHQ-9 Total Score 0     Last GAD7 score on record =   NICOL-7 SCORE 2023   Total Score 0     Alcohol Score = 3    HEALTH MAINTENANCE:  Cholesterol:   Cholesterol   Date Value Ref Range Status   2022 198 <200 mg/dL Final   2019 214 (H) <200 mg/dL Final     Comment:     Desirable:       <200 mg/dl   Last Mammo: Not applicable, Result: Not applicable, Next Mammo: Due at age 40   Pap:   Lab Results   Component Value Date    PAP NIL 2020    PAP ASC-US 2019    PAP ASC-US 2018 WNL HPV other (+)  Colonoscopy:  NA, Result: Not applicable, Next  "Colonoscopy: NA years.  Dexa:  NA    Health maintenance updated:  yes    HISTORY:  OB History    Para Term  AB Living   0 0 0 0 0 0   SAB IAB Ectopic Multiple Live Births   0 0 0 0 0       Patient Active Problem List   Diagnosis     Obesity     Morbid obesity (H)     Personal history of cervical dysplasia     Uses oral contraception     Past Surgical History:   Procedure Laterality Date     WRIST SURGERY  2019    broke wrist      Social History     Tobacco Use     Smoking status: Former     Smokeless tobacco: Never     Tobacco comments:     quit    Substance Use Topics     Alcohol use: Yes     Comment: occ.      Problem (# of Occurrences) Relation (Name,Age of Onset)    Diabetes (2) Mother, Father: type 2     Heart Disease (1) Mother    Hypertension (1) Mother    No Known Problems (7) Sister, Brother, Maternal Grandmother, Maternal Grandfather, Paternal Grandmother, Paternal Grandfather, Other            Current Outpatient Medications   Medication Sig     amphetamine-dextroamphetamine (ADDERALL) 20 MG tablet Take 20 mg by mouth 2 times daily      cholecalciferol (VITAMIN D3) 125 mcg (5000 units) capsule      ketoconazole (NIZORAL) 2 % external shampoo Apply to affected area of damp skin leave on for 5 min then rinse. Repeat as needed.     lactobacillus rhamnosus, GG, (CULTURELL) capsule Take 1 capsule by mouth 2 times daily     levonorgestrel-ethinyl estradiol (VIENVA) 0.1-20 MG-MCG tablet Take 1 tablet by mouth daily     multivitamin w/minerals (MULTI-VITAMIN) tablet Take 1 tablet by mouth daily     No current facility-administered medications for this visit.     No Known Allergies    Past medical, surgical, social and family histories were reviewed and updated in EPIC.    ROS:   12 point review of systems negative other than symptoms noted below or in the HPI.  No urinary frequency or dysuria, bladder or kidney problems    EXAM:  /86   Ht 1.664 m (5' 5.5\")   Wt 129.7 kg (286 lb)   BMI " 46.87 kg/m     BMI: Body mass index is 46.87 kg/m .    PHYSICAL EXAM:  Constitutional:   Appearance: Well nourished, well developed, alert, in no acute distress  Neck:  Lymph Nodes:  No lymphadenopathy present    Thyroid:  Gland size normal, nontender, no nodules or masses present  on palpation  Chest:  Respiratory Effort:  Breathing unlabored  Cardiovascular:    Heart: Auscultation:  Regular rate, normal rhythm, no murmurs present  Breasts: Palpation of Breasts and Axillae:  No masses present on palpation, no breast tenderness., Axillary Lymph Nodes:  No lymphadenopathy present. and No nodularity, asymmetry or nipple discharge bilaterally.  Gastrointestinal:   Abdominal Examination:  Abdomen nontender to palpation, tone normal without rigidity or guarding, no masses present, umbilicus without lesions   Liver and Spleen:  No hepatomegaly present, liver nontender to palpation    Hernias:  No hernias present  Lymphatic: Lymph Nodes:  No other lymphadenopathy present  Skin:  General Inspection:  No rashes present, no lesions present, no areas of  discoloration  Neurologic:    Mental Status:  Oriented X3.  Normal strength and tone, sensory exam                grossly normal, mentation intact and speech normal.    Psychiatric:   Mentation appears normal and affect normal/bright.         Pelvic Exam:  External Genitalia:     Normal appearance for age, no discharge present, no tenderness present, no inflammatory lesions present, color normal  Vagina:     Normal vaginal vault without central or paravaginal defects, no discharge present, no inflammatory lesions present, no masses present  Bladder:     Nontender to palpation  Urethra:   Urethral Body:  Urethra palpation normal, urethra structural support normal   Urethral Meatus:  No erythema or lesions present  Cervix:     Appearance healthy, no lesions present, nontender to palpation, no bleeding present  Uterus:     Uterus: firm, normal sized and nontender, anteverted in  position.   Adnexa:     No adnexal tenderness present, no adnexal masses present  Perineum:     Perineum within normal limits, no evidence of trauma, no rashes or skin lesions present  Anus:     Anus within normal limits, no hemorrhoids present  Inguinal Lymph Nodes:     No lymphadenopathy present  Pubic Hair:     Normal pubic hair distribution for age  Genitalia and Groin:     No rashes present, no lesions present, no areas of discoloration, no masses present      COUNSELING:   Reviewed preventive health counseling, as reflected in patient instructions  Special attention given to:        Regular exercise       Healthy diet/nutrition       Contraception       Family planning    BMI: Body mass index is 46.87 kg/m .  Weight management plan: Discussed healthy diet and exercise guidelines Patient was referred to their PCP to discuss a diet and exercise plan.    ASSESSMENT:  32 year old female with satisfactory annual exam.    ICD-10-CM    1. Encounter for gynecological examination without abnormal finding  Z01.419 Pap thin layer screen with HPV - recommended age 30 - 65 years      2. Uses oral contraception  Z30.41 levonorgestrel-ethinyl estradiol (VIENVA) 0.1-20 MG-MCG tablet      3. Encounter for lipid screening for cardiovascular disease  Z13.220 Lipid panel reflex to direct LDL Fasting    Z13.6       4. Screening for diabetes mellitus  Z13.1 Glucose, whole blood          PLAN:  Patient Instructions   Return to clinic for screening Lipids and Fasting Blood sugar.  Follow up with your primary care provider for your other medical problems.  Continue self breast exam.  Increase physical activity and exercise.  Lab and pap smear results will be called to the patient.  Co-testing repeated today due to history of AXEL I in 2018 and 2019.  If normal, will repeat in 3yrs.  BMI >25  Weight loss encouraged.       Lashonda Anderson MD

## 2023-02-28 ENCOUNTER — OFFICE VISIT (OUTPATIENT)
Dept: OBGYN | Facility: CLINIC | Age: 33
End: 2023-02-28
Payer: COMMERCIAL

## 2023-02-28 VITALS
WEIGHT: 286 LBS | SYSTOLIC BLOOD PRESSURE: 122 MMHG | HEIGHT: 66 IN | DIASTOLIC BLOOD PRESSURE: 86 MMHG | BODY MASS INDEX: 45.96 KG/M2

## 2023-02-28 DIAGNOSIS — Z30.41 USES ORAL CONTRACEPTION: ICD-10-CM

## 2023-02-28 DIAGNOSIS — Z13.220 ENCOUNTER FOR LIPID SCREENING FOR CARDIOVASCULAR DISEASE: ICD-10-CM

## 2023-02-28 DIAGNOSIS — Z13.1 SCREENING FOR DIABETES MELLITUS: ICD-10-CM

## 2023-02-28 DIAGNOSIS — Z01.419 ENCOUNTER FOR GYNECOLOGICAL EXAMINATION WITHOUT ABNORMAL FINDING: Primary | ICD-10-CM

## 2023-02-28 DIAGNOSIS — Z13.6 ENCOUNTER FOR LIPID SCREENING FOR CARDIOVASCULAR DISEASE: ICD-10-CM

## 2023-02-28 PROCEDURE — 99395 PREV VISIT EST AGE 18-39: CPT | Performed by: OBSTETRICS & GYNECOLOGY

## 2023-02-28 PROCEDURE — G0145 SCR C/V CYTO,THINLAYER,RESCR: HCPCS | Performed by: OBSTETRICS & GYNECOLOGY

## 2023-02-28 PROCEDURE — 87624 HPV HI-RISK TYP POOLED RSLT: CPT | Performed by: OBSTETRICS & GYNECOLOGY

## 2023-02-28 RX ORDER — MULTIPLE VITAMINS W/ MINERALS TAB 9MG-400MCG
1 TAB ORAL DAILY
COMMUNITY

## 2023-02-28 RX ORDER — LACTOBACILLUS RHAMNOSUS GG 10B CELL
1 CAPSULE ORAL 2 TIMES DAILY
COMMUNITY

## 2023-02-28 RX ORDER — LEVONORGESTREL/ETHIN.ESTRADIOL 0.1-0.02MG
1 TABLET ORAL DAILY
Qty: 90 TABLET | Refills: 4 | Status: SHIPPED | OUTPATIENT
Start: 2023-02-28 | End: 2023-07-14

## 2023-02-28 ASSESSMENT — PATIENT HEALTH QUESTIONNAIRE - PHQ9
SUM OF ALL RESPONSES TO PHQ QUESTIONS 1-9: 0
5. POOR APPETITE OR OVEREATING: NOT AT ALL

## 2023-02-28 ASSESSMENT — ANXIETY QUESTIONNAIRES
5. BEING SO RESTLESS THAT IT IS HARD TO SIT STILL: NOT AT ALL
2. NOT BEING ABLE TO STOP OR CONTROL WORRYING: NOT AT ALL
3. WORRYING TOO MUCH ABOUT DIFFERENT THINGS: NOT AT ALL
7. FEELING AFRAID AS IF SOMETHING AWFUL MIGHT HAPPEN: NOT AT ALL
GAD7 TOTAL SCORE: 0
IF YOU CHECKED OFF ANY PROBLEMS ON THIS QUESTIONNAIRE, HOW DIFFICULT HAVE THESE PROBLEMS MADE IT FOR YOU TO DO YOUR WORK, TAKE CARE OF THINGS AT HOME, OR GET ALONG WITH OTHER PEOPLE: NOT DIFFICULT AT ALL
6. BECOMING EASILY ANNOYED OR IRRITABLE: NOT AT ALL
GAD7 TOTAL SCORE: 0
1. FEELING NERVOUS, ANXIOUS, OR ON EDGE: NOT AT ALL

## 2023-02-28 NOTE — PATIENT INSTRUCTIONS
Return to clinic for screening Lipids and Fasting Blood sugar.  Follow up with your primary care provider for your other medical problems.  Continue self breast exam.  Increase physical activity and exercise.  Lab and pap smear results will be called to the patient.  Co-testing repeated today due to history of AXEL I in 2018 and 2019.  If normal, will repeat in 3yrs.  BMI >25  Weight loss encouraged.

## 2023-03-02 LAB
BKR LAB AP GYN ADEQUACY: NORMAL
BKR LAB AP GYN INTERPRETATION: NORMAL
BKR LAB AP HPV REFLEX: NORMAL
BKR LAB AP PREVIOUS ABNL DX: NORMAL
BKR LAB AP PREVIOUS ABNORMAL: NORMAL
PATH REPORT.COMMENTS IMP SPEC: NORMAL
PATH REPORT.COMMENTS IMP SPEC: NORMAL
PATH REPORT.RELEVANT HX SPEC: NORMAL

## 2023-03-06 LAB
HUMAN PAPILLOMA VIRUS 16 DNA: NEGATIVE
HUMAN PAPILLOMA VIRUS 18 DNA: NEGATIVE
HUMAN PAPILLOMA VIRUS FINAL DIAGNOSIS: NORMAL
HUMAN PAPILLOMA VIRUS OTHER HR: NEGATIVE

## 2023-03-15 ENCOUNTER — LAB (OUTPATIENT)
Dept: LAB | Facility: CLINIC | Age: 33
End: 2023-03-15
Payer: COMMERCIAL

## 2023-03-15 DIAGNOSIS — Z13.1 SCREENING FOR DIABETES MELLITUS: ICD-10-CM

## 2023-03-15 DIAGNOSIS — Z13.220 ENCOUNTER FOR LIPID SCREENING FOR CARDIOVASCULAR DISEASE: ICD-10-CM

## 2023-03-15 DIAGNOSIS — Z13.6 ENCOUNTER FOR LIPID SCREENING FOR CARDIOVASCULAR DISEASE: ICD-10-CM

## 2023-03-15 LAB
CHOLEST SERPL-MCNC: 216 MG/DL
GLUCOSE BLD-MCNC: 88 MG/DL (ref 60–99)
HDLC SERPL-MCNC: 45 MG/DL
LDLC SERPL CALC-MCNC: 138 MG/DL
NONHDLC SERPL-MCNC: 171 MG/DL
TRIGL SERPL-MCNC: 167 MG/DL

## 2023-03-15 PROCEDURE — 82947 ASSAY GLUCOSE BLOOD QUANT: CPT

## 2023-03-15 PROCEDURE — 36415 COLL VENOUS BLD VENIPUNCTURE: CPT

## 2023-03-15 PROCEDURE — 80061 LIPID PANEL: CPT

## 2023-03-15 NOTE — RESULT ENCOUNTER NOTE
Please inform of results; cholesterol again mildly elevated --total, LDL and triglycerides all above our goals.  Needs to work hard on diet and exercise.  For total cholesterol and LDL --limit trans and saturated fats, increase healthy fats, increase fruits/vegetables/fiber, etc.  For triglycerides, also needs to watch carbs and sugar intake.  May consider starting daily fish oil to help as well.  Should discuss and follow up with primary care provider as well.

## 2023-05-02 NOTE — PATIENT INSTRUCTIONS
Follow-up with rheumatology    Follow-up with ophthalmology      
<-- Click to add NO significant Past Surgical History

## 2023-07-14 ENCOUNTER — MYC MEDICAL ADVICE (OUTPATIENT)
Dept: OBGYN | Facility: CLINIC | Age: 33
End: 2023-07-14
Payer: COMMERCIAL

## 2023-07-14 DIAGNOSIS — Z30.41 USES ORAL CONTRACEPTION: ICD-10-CM

## 2023-07-14 RX ORDER — LEVONORGESTREL/ETHIN.ESTRADIOL 0.1-0.02MG
1 TABLET ORAL DAILY
Qty: 112 TABLET | Refills: 2 | Status: SHIPPED | OUTPATIENT
Start: 2023-07-14 | End: 2024-04-04

## 2023-11-24 ENCOUNTER — IMMUNIZATION (OUTPATIENT)
Dept: FAMILY MEDICINE | Facility: CLINIC | Age: 33
End: 2023-11-24
Payer: COMMERCIAL

## 2023-11-24 DIAGNOSIS — Z23 NEED FOR PROPHYLACTIC VACCINATION AND INOCULATION AGAINST INFLUENZA: Primary | ICD-10-CM

## 2023-11-24 DIAGNOSIS — Z23 HIGH PRIORITY FOR 2019-NCOV VACCINE: ICD-10-CM

## 2023-11-24 PROCEDURE — 90686 IIV4 VACC NO PRSV 0.5 ML IM: CPT

## 2023-11-24 PROCEDURE — 90480 ADMN SARSCOV2 VAC 1/ONLY CMP: CPT

## 2023-11-24 PROCEDURE — 99207 PR NO CHARGE NURSE ONLY: CPT

## 2023-11-24 PROCEDURE — 91320 SARSCV2 VAC 30MCG TRS-SUC IM: CPT

## 2023-11-24 PROCEDURE — 90471 IMMUNIZATION ADMIN: CPT

## 2023-11-24 NOTE — PROGRESS NOTES
Patient presents to influenza program requesting influenza vaccination.  Standing orders implemented.    Vaccination given by Ziyad Brennan CMA on 11/24/2023 at 8:42 AM    Recorded by Ziyad Brennan CMA on 11/24/2023 at 8:42 AM

## 2024-01-29 ENCOUNTER — PATIENT OUTREACH (OUTPATIENT)
Dept: CARE COORDINATION | Facility: CLINIC | Age: 34
End: 2024-01-29
Payer: COMMERCIAL

## 2024-02-12 ENCOUNTER — PATIENT OUTREACH (OUTPATIENT)
Dept: CARE COORDINATION | Facility: CLINIC | Age: 34
End: 2024-02-12
Payer: COMMERCIAL

## 2024-04-04 ENCOUNTER — OFFICE VISIT (OUTPATIENT)
Dept: OBGYN | Facility: CLINIC | Age: 34
End: 2024-04-04
Payer: COMMERCIAL

## 2024-04-04 VITALS
WEIGHT: 293 LBS | BODY MASS INDEX: 47.09 KG/M2 | SYSTOLIC BLOOD PRESSURE: 126 MMHG | DIASTOLIC BLOOD PRESSURE: 86 MMHG | HEIGHT: 66 IN

## 2024-04-04 DIAGNOSIS — Z01.419 ENCOUNTER FOR GYNECOLOGICAL EXAMINATION WITHOUT ABNORMAL FINDING: Primary | ICD-10-CM

## 2024-04-04 DIAGNOSIS — Z30.41 USES ORAL CONTRACEPTION: ICD-10-CM

## 2024-04-04 DIAGNOSIS — Z13.6 ENCOUNTER FOR LIPID SCREENING FOR CARDIOVASCULAR DISEASE: ICD-10-CM

## 2024-04-04 DIAGNOSIS — Z13.220 ENCOUNTER FOR LIPID SCREENING FOR CARDIOVASCULAR DISEASE: ICD-10-CM

## 2024-04-04 DIAGNOSIS — Z13.1 SCREENING FOR DIABETES MELLITUS: ICD-10-CM

## 2024-04-04 DIAGNOSIS — Z13.29 SCREENING FOR THYROID DISORDER: ICD-10-CM

## 2024-04-04 PROCEDURE — 99395 PREV VISIT EST AGE 18-39: CPT | Performed by: OBSTETRICS & GYNECOLOGY

## 2024-04-04 RX ORDER — LEVONORGESTREL/ETHIN.ESTRADIOL 0.1-0.02MG
1 TABLET ORAL DAILY
Qty: 112 TABLET | Refills: 4 | Status: SHIPPED | OUTPATIENT
Start: 2024-04-04

## 2024-04-04 ASSESSMENT — ANXIETY QUESTIONNAIRES
2. NOT BEING ABLE TO STOP OR CONTROL WORRYING: NOT AT ALL
IF YOU CHECKED OFF ANY PROBLEMS ON THIS QUESTIONNAIRE, HOW DIFFICULT HAVE THESE PROBLEMS MADE IT FOR YOU TO DO YOUR WORK, TAKE CARE OF THINGS AT HOME, OR GET ALONG WITH OTHER PEOPLE: NOT DIFFICULT AT ALL
1. FEELING NERVOUS, ANXIOUS, OR ON EDGE: NOT AT ALL
6. BECOMING EASILY ANNOYED OR IRRITABLE: NOT AT ALL
7. FEELING AFRAID AS IF SOMETHING AWFUL MIGHT HAPPEN: NOT AT ALL
GAD7 TOTAL SCORE: 0
GAD7 TOTAL SCORE: 0
3. WORRYING TOO MUCH ABOUT DIFFERENT THINGS: NOT AT ALL
5. BEING SO RESTLESS THAT IT IS HARD TO SIT STILL: NOT AT ALL

## 2024-04-04 ASSESSMENT — PATIENT HEALTH QUESTIONNAIRE - PHQ9
SUM OF ALL RESPONSES TO PHQ QUESTIONS 1-9: 0
5. POOR APPETITE OR OVEREATING: NOT AT ALL

## 2024-04-04 NOTE — PROGRESS NOTES
Estefania is a 33 year old  female who presents for annual exam.     Besides routine health maintenance, she has no other health concerns today .    HPI:  Here today for yearly exam --doing well.  Amenorrheic with continuous OCPs.  No breakthrough bleeding/spotting.  +SA --no issues.  Denies bowel/bladder issues.  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 (Menstrual status: Birth Control).    Regular menses? no    Her current contraception method is: oral contraceptives.  She  reports that she has quit smoking. She has never used smokeless tobacco.    Patient is sexually active.  STD testing offered?  Declined  Last PHQ-9 score on record =       2024     2:01 PM   PHQ-9 SCORE   PHQ-9 Total Score 0     Last GAD7 score on record =       2024     2:01 PM   NICOL-7 SCORE   Total Score 0     Alcohol Score = 3    HEALTH MAINTENANCE:  Cholesterol:   Cholesterol   Date Value Ref Range Status   03/15/2023 216 (H) <200 mg/dL Final   2022 198 <200 mg/dL Final   2019 214 (H) <200 mg/dL Final     Comment:     Desirable:       <200 mg/dl   Last Mammo: Not applicable, Result: Not applicable, Next Mammo: Due at age 40   Pap:   Lab Results   Component Value Date    GYNINTERP  2023     Negative for Intraepithelial Lesion or Malignancy (NILM)    PAP NIL 2020    PAP ASC-US 2019    PAP ASC-US 2018 WNL HPV (-)neg  Colonoscopy:  NA, Result: Not applicable, Next  Colonoscopy: NA years.  Dexa:  NA    Health maintenance updated:  yes    HISTORY:  OB History    Para Term  AB Living   0 0 0 0 0 0   SAB IAB Ectopic Multiple Live Births   0 0 0 0 0       Patient Active Problem List   Diagnosis    Obesity    Morbid obesity (H)    Personal history of cervical dysplasia    Uses oral contraception     Past Surgical History:   Procedure Laterality Date    WRIST SURGERY  2019    broke wrist      Social History     Tobacco Use    Smoking status: Former    Smokeless tobacco: Never    Tobacco comments:     quit    Substance Use Topics    Alcohol use: Yes     Comment: occ.      Problem (# of Occurrences) Relation (Name,Age of Onset)    Diabetes (2) Mother, Father: type 2     Heart Disease (1) Mother    Hypertension (1) Mother    No Known Problems (7) Sister, Brother, Maternal Grandmother, Maternal Grandfather, Paternal Grandmother, Paternal Grandfather, Other              Current Outpatient Medications   Medication Sig Dispense Refill    amphetamine-dextroamphetamine (ADDERALL) 20 MG tablet Take 20 mg by mouth 2 times daily       cholecalciferol (VITAMIN D3) 125 mcg (5000 units) capsule       ketoconazole (NIZORAL) 2 % external shampoo Apply to affected area of damp skin leave on for 5 min then rinse. Repeat as needed. 120 mL 1    levonorgestrel-ethinyl estradiol (VIENVA) 0.1-20 MG-MCG tablet Take 1 tablet by mouth daily Take active pills continuously 112 tablet 4    multivitamin w/minerals (MULTI-VITAMIN) tablet Take 1 tablet by mouth daily      lactobacillus rhamnosus, GG, (CULTURELL) capsule Take 1 capsule by mouth 2 times daily       No current facility-administered medications for this visit.     No Known Allergies    Past medical, surgical, social and family histories were reviewed and updated in EPIC.    ROS:   12 point review of systems negative other than symptoms noted below or in the HPI.  No urinary frequency or dysuria, bladder or kidney  "problems    EXAM:  /86   Ht 1.67 m (5' 5.75\")   Wt 132.9 kg (293 lb)   BMI 47.65 kg/m     BMI: Body mass index is 47.65 kg/m .    PHYSICAL EXAM:  Constitutional:   Appearance: Well nourished, well developed, alert, in no acute distress  Neck:  Lymph Nodes:  No lymphadenopathy present    Thyroid:  Gland size normal, nontender, no nodules or masses present  on palpation  Chest:  Respiratory Effort:  Breathing unlabored  Cardiovascular:    Heart: Auscultation:  Regular rate, normal rhythm, no murmurs present  Breasts: Palpation of Breasts and Axillae:  No masses present on palpation, no breast tenderness., Axillary Lymph Nodes:  No lymphadenopathy present., and No nodularity, asymmetry or nipple discharge bilaterally.  Gastrointestinal:   Abdominal Examination:  Abdomen nontender to palpation, tone normal without rigidity or guarding, no masses present, umbilicus without lesions   Liver and Spleen:  No hepatomegaly present, liver nontender to palpation    Hernias:  No hernias present  Lymphatic: Lymph Nodes:  No other lymphadenopathy present  Skin:  General Inspection:  No rashes present, no lesions present, no areas of  discoloration  Neurologic:    Mental Status:  Oriented X3.  Normal strength and tone, sensory exam                grossly normal, mentation intact and speech normal.    Psychiatric:   Mentation appears normal and affect normal/bright.         Pelvic Exam:  External Genitalia:     Normal appearance for age, no discharge present, no tenderness present, no inflammatory lesions present, color normal  Vagina:     Normal vaginal vault without central or paravaginal defects, no discharge present, no inflammatory lesions present, no masses present  Bladder:     Nontender to palpation  Urethra:   Urethral Body:  Urethra palpation normal, urethra structural support normal   Urethral Meatus:  No erythema or lesions present  Cervix:     Appearance healthy, no lesions present, nontender to palpation, no " bleeding present  Uterus:     Uterus: firm, normal sized and nontender, anteverted in position.   Adnexa:     No adnexal tenderness present, no adnexal masses present  Perineum:     Perineum within normal limits, no evidence of trauma, no rashes or skin lesions present  Anus:     Anus within normal limits, no hemorrhoids present  Inguinal Lymph Nodes:     No lymphadenopathy present  Pubic Hair:     Normal pubic hair distribution for age  Genitalia and Groin:     No rashes present, no lesions present, no areas of discoloration, no masses present    COUNSELING:   Reviewed preventive health counseling, as reflected in patient instructions  Special attention given to:        Regular exercise       Healthy diet/nutrition       Contraception    BMI: Body mass index is 47.65 kg/m .  Weight management plan: Discussed healthy diet and exercise guidelines Patient was referred to their PCP to discuss a diet and exercise plan.    ASSESSMENT:  33 year old female with satisfactory annual exam.    ICD-10-CM    1. Encounter for gynecological examination without abnormal finding  Z01.419       2. Uses oral contraception  Z30.41 levonorgestrel-ethinyl estradiol (VIENVA) 0.1-20 MG-MCG tablet      3. Encounter for lipid screening for cardiovascular disease  Z13.220 Lipid panel reflex to direct LDL Fasting    Z13.6       4. Screening for diabetes mellitus  Z13.1 Glucose, whole blood      5. Screening for thyroid disorder  Z13.29 TSH with free T4 reflex          PLAN:  Patient Instructions   Follow up with your primary care provider for your other medical problems.  Return to clinic for screening Lipids, thyroid and Fasting Blood sugar.   Continue self breast exam.  Increase physical activity and exercise.  Lab results will be called to the patient.  Usual safety and preventative measures counseling done.  BMI >25  Weight loss encouraged.  Discussed small day to day changes with meal prep, using the stairs when able, parking further  away, etc to increase steps/activity with busy work schedule.  Last pap smear (2023) was normal and negative for the DNA of high risk HPV subtypes.  No pap was obtained this year but will repeat in 2026 due to history of mild dysplasia.  This was discussed with the patient and she agrees with the plan.       Lashonda Anderson MD

## 2024-04-04 NOTE — PATIENT INSTRUCTIONS
Follow up with your primary care provider for your other medical problems.  Return to clinic for screening Lipids, thyroid and Fasting Blood sugar.   Continue self breast exam.  Increase physical activity and exercise.  Lab results will be called to the patient.  Usual safety and preventative measures counseling done.  BMI >25  Weight loss encouraged.  Discussed small day to day changes with meal prep, using the stairs when able, parking further away, etc to increase steps/activity with busy work schedule.  Last pap smear (2023) was normal and negative for the DNA of high risk HPV subtypes.  No pap was obtained this year but will repeat in 2026 due to history of mild dysplasia.  This was discussed with the patient and she agrees with the plan.

## 2024-04-11 ENCOUNTER — OFFICE VISIT (OUTPATIENT)
Dept: FAMILY MEDICINE | Facility: CLINIC | Age: 34
End: 2024-04-11
Payer: COMMERCIAL

## 2024-04-11 VITALS
BODY MASS INDEX: 48.82 KG/M2 | OXYGEN SATURATION: 92 % | DIASTOLIC BLOOD PRESSURE: 75 MMHG | HEART RATE: 88 BPM | HEIGHT: 65 IN | TEMPERATURE: 97.1 F | RESPIRATION RATE: 10 BRPM | SYSTOLIC BLOOD PRESSURE: 125 MMHG | WEIGHT: 293 LBS

## 2024-04-11 DIAGNOSIS — H57.12 ORBITAL PAIN, LEFT: ICD-10-CM

## 2024-04-11 DIAGNOSIS — H57.89 EYE REDNESS: Primary | ICD-10-CM

## 2024-04-11 PROCEDURE — 99214 OFFICE O/P EST MOD 30 MIN: CPT | Performed by: PHYSICIAN ASSISTANT

## 2024-04-11 RX ORDER — NEOMYCIN POLYMYXIN B SULFATES AND DEXAMETHASONE 3.5; 10000; 1 MG/ML; [USP'U]/ML; MG/ML
SUSPENSION/ DROPS OPHTHALMIC
Qty: 5 ML | Refills: 1 | Status: SHIPPED | OUTPATIENT
Start: 2024-04-11

## 2024-04-11 ASSESSMENT — PAIN SCALES - GENERAL: PAINLEVEL: NO PAIN (0)

## 2024-04-11 ASSESSMENT — ENCOUNTER SYMPTOMS: EYE PAIN: 1

## 2024-04-11 NOTE — PROGRESS NOTES
Assessment & Plan     Eye redness  Orbital pain, left    Unspecified eye redness with associated orbital pressure/discomfort.  Reasonable to pursue steroid eyedrops.  Recommended close monitoring for secondary infection.  Hopefully antibiotic and this drops can help keep this away.  Close follow-up with any changes in her vision or worsening/new symptoms.  Reassured by workup with ophthalmology/rheumatology.  Option to see ENT once again to see if there is any underlying sinus type issue that is leading to this.  We discussed pursuing sinus CT versus oral CT.  Plan for orbital CT at this time to see if we can rule out any other cause.  Last sinus CT was back in [place] in 2020.  She is comfortable with this plan.    - neomycin-polymixin-dexAMETHasone (MAXITROL) 0.1 % ophthalmic suspension  Dispense: 5 mL; Refill: 1  - CT Orbits wo Contrast      30 minutes spent by me on the date of the encounter doing chart review, review of test results, interpretation of tests, patient visit, and documentation       Subjective   Estefania is a pleasant 33 year old, presenting for the following health issues:  Eye Problem    Here today for 3 to 4-month history of left eye redness with associated left orbital pressure.  This is a recurrent issue for her.  Treated successfully with steroid eyedrops.  Has seen ear nose and throat, optometry, ophthalmology, as well as rheumatology.    No inciting injury or trauma.  No recent illness.  States that her eye feels fatigued however denies pain, blurry vision, double vision, or loss of vision.  Last time this occurred was in 2022.  Went on a short taper of steroid drops which resolved this issue for her.  Wonders if there is an underlying orbital/sinus issue that is causing her symptoms.  Reviewed notes from PCP, ENT, rheumatology with seemingly no clear cause for symptoms.    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  Had her routine eye exam last year.    Eye Problem     History of Present  Illness       Reason for visit:  L eye redness/pain (NOT pinkeye) - L cheek pain  Symptom onset:  3-4 weeks ago  Symptoms include:  Pain in L orbital / cheek area - eye muscles feel strained, eye is red - NO vision issues  Symptom intensity:  Moderate  Symptom progression:  Worsening  Had these symptoms before:  Yes  Has tried/received treatment for these symptoms:  Yes  Previous treatment was successful:  Yes  Prior treatment description:  Steriod eye drops  What makes it worse:  No  What makes it better:  Not particularly    She eats 0-1 servings of fruits and vegetables daily.She consumes 2 sweetened beverage(s) daily.She exercises with enough effort to increase her heart rate 10 to 19 minutes per day.  She exercises with enough effort to increase her heart rate 3 or less days per week.   She is taking medications regularly.         Review of Systems  Constitutional, neuro, ENT, endocrine, pulmonary, cardiac, gastrointestinal, genitourinary, musculoskeletal, integument and psychiatric systems are negative, except as otherwise noted.      Objective    There were no vitals taken for this visit.  There is no height or weight on file to calculate BMI.  Physical Exam   GENERAL: alert and no distress  EYES: Left eye with redness to the conjunctiva.  Lids flipped with no signs of foreign body.  Extraocular movements intact.  No surrounding orbital swelling/redness/erythema.  Otherwise eyes grossly normal to inspection, PERRL and conjunctivae and sclerae normal  HENT: ear canals and TM's normal, nose and mouth without ulcers or lesions  NECK: no adenopathy, no asymmetry, masses, or scars  RESP: lungs clear to auscultation - no rales, rhonchi or wheezes  CV: regular rate and rhythm, normal S1 S2, no S3 or S4, no murmur, click or rub, no peripheral edema  MS: no gross musculoskeletal defects noted, no edema  SKIN: no suspicious lesions or rashes  NEURO: Normal strength and tone, mentation intact and speech normal  PSYCH:  mentation appears normal, affect normal/bright    The likelihood of other entities in the differential is insufficient to justify any further testing for them at this time. This was explained to the patient. The patient was advised that persistent or worsening symptoms would require further evaluation. Patient advised to call the office and if unable to reach to go to the emergency room if they develop any new or worsening symptoms. Expressed understanding and agreement with above stated plan.         Signed Electronically by: Aiden Isaac PA-C

## 2024-04-15 ENCOUNTER — LAB (OUTPATIENT)
Dept: LAB | Facility: CLINIC | Age: 34
End: 2024-04-15
Payer: COMMERCIAL

## 2024-04-15 DIAGNOSIS — Z13.6 ENCOUNTER FOR LIPID SCREENING FOR CARDIOVASCULAR DISEASE: ICD-10-CM

## 2024-04-15 DIAGNOSIS — Z13.29 SCREENING FOR THYROID DISORDER: ICD-10-CM

## 2024-04-15 DIAGNOSIS — Z13.1 SCREENING FOR DIABETES MELLITUS: ICD-10-CM

## 2024-04-15 DIAGNOSIS — Z13.220 ENCOUNTER FOR LIPID SCREENING FOR CARDIOVASCULAR DISEASE: ICD-10-CM

## 2024-04-15 LAB
CHOLEST SERPL-MCNC: 221 MG/DL
FASTING STATUS PATIENT QL REPORTED: YES
GLUCOSE BLD-MCNC: 91 MG/DL (ref 60–99)
HDLC SERPL-MCNC: 38 MG/DL
LDLC SERPL CALC-MCNC: 159 MG/DL
NONHDLC SERPL-MCNC: 183 MG/DL
TRIGL SERPL-MCNC: 119 MG/DL
TSH SERPL DL<=0.005 MIU/L-ACNC: 2.42 UIU/ML (ref 0.3–4.2)

## 2024-04-15 PROCEDURE — 36415 COLL VENOUS BLD VENIPUNCTURE: CPT

## 2024-04-15 PROCEDURE — 84443 ASSAY THYROID STIM HORMONE: CPT

## 2024-04-15 PROCEDURE — 82947 ASSAY GLUCOSE BLOOD QUANT: CPT

## 2024-04-15 PROCEDURE — 80061 LIPID PANEL: CPT

## 2024-04-16 NOTE — RESULT ENCOUNTER NOTE
Please inform of results;  thyroid and fasting blood sugar normal but cholesterol elevated  I'd like Estefania to follow up with her PCP regarding these results and to discuss management/monitoring moving forward

## 2024-04-24 ENCOUNTER — HOSPITAL ENCOUNTER (OUTPATIENT)
Dept: CT IMAGING | Facility: CLINIC | Age: 34
Discharge: HOME | End: 2024-04-24
Attending: PHYSICIAN ASSISTANT | Admitting: PHYSICIAN ASSISTANT
Payer: COMMERCIAL

## 2024-04-24 DIAGNOSIS — H57.89 EYE REDNESS: ICD-10-CM

## 2024-04-24 DIAGNOSIS — H57.12 ORBITAL PAIN, LEFT: ICD-10-CM

## 2024-04-24 PROCEDURE — 70480 CT ORBIT/EAR/FOSSA W/O DYE: CPT
